# Patient Record
Sex: MALE | Race: BLACK OR AFRICAN AMERICAN | NOT HISPANIC OR LATINO | Employment: UNEMPLOYED | URBAN - METROPOLITAN AREA
[De-identification: names, ages, dates, MRNs, and addresses within clinical notes are randomized per-mention and may not be internally consistent; named-entity substitution may affect disease eponyms.]

---

## 2017-01-13 ENCOUNTER — HOSPITAL ENCOUNTER (OUTPATIENT)
Dept: MEDSURG UNIT | Facility: HOSPITAL | Age: 27
End: 2017-01-14
Attending: INTERNAL MEDICINE | Admitting: INTERNAL MEDICINE

## 2017-01-20 ENCOUNTER — HISTORICAL (OUTPATIENT)
Dept: INTERNAL MEDICINE | Facility: CLINIC | Age: 27
End: 2017-01-20

## 2017-01-26 ENCOUNTER — HISTORICAL (OUTPATIENT)
Dept: INTERNAL MEDICINE | Facility: CLINIC | Age: 27
End: 2017-01-26

## 2017-03-13 ENCOUNTER — HISTORICAL (OUTPATIENT)
Dept: ENDOSCOPY | Facility: HOSPITAL | Age: 27
End: 2017-03-13

## 2017-05-30 ENCOUNTER — HISTORICAL (OUTPATIENT)
Dept: INFUSION THERAPY | Facility: HOSPITAL | Age: 27
End: 2017-05-30

## 2017-06-13 ENCOUNTER — HISTORICAL (OUTPATIENT)
Dept: INFUSION THERAPY | Facility: HOSPITAL | Age: 27
End: 2017-06-13

## 2017-08-04 ENCOUNTER — HISTORICAL (OUTPATIENT)
Dept: INFUSION THERAPY | Facility: HOSPITAL | Age: 27
End: 2017-08-04

## 2017-09-29 ENCOUNTER — HISTORICAL (OUTPATIENT)
Dept: INFUSION THERAPY | Facility: HOSPITAL | Age: 27
End: 2017-09-29

## 2017-09-29 LAB
ABS NEUT (OLG): 2.62 X10(3)/MCL (ref 2.1–9.2)
ALBUMIN SERPL-MCNC: 3.8 GM/DL (ref 3.4–5)
ALBUMIN/GLOB SERPL: 1 RATIO (ref 1–2)
ALP SERPL-CCNC: 104 UNIT/L (ref 45–117)
ALT SERPL-CCNC: 14 UNIT/L (ref 12–78)
AST SERPL-CCNC: 15 UNIT/L (ref 15–37)
BASOPHILS # BLD AUTO: 0.03 X10(3)/MCL
BASOPHILS NFR BLD AUTO: 1 % (ref 0–1)
BILIRUB SERPL-MCNC: 0.3 MG/DL (ref 0.2–1)
BILIRUBIN DIRECT+TOT PNL SERPL-MCNC: 0.1 MG/DL
BILIRUBIN DIRECT+TOT PNL SERPL-MCNC: 0.2 MG/DL
BUN SERPL-MCNC: 9 MG/DL (ref 7–18)
CALCIUM SERPL-MCNC: 9.1 MG/DL (ref 8.5–10.1)
CHLORIDE SERPL-SCNC: 104 MMOL/L (ref 98–107)
CO2 SERPL-SCNC: 29 MMOL/L (ref 21–32)
CREAT SERPL-MCNC: 1 MG/DL (ref 0.6–1.3)
EOSINOPHIL # BLD AUTO: 0.03 X10(3)/MCL
EOSINOPHIL NFR BLD AUTO: 1 % (ref 0–5)
ERYTHROCYTE [DISTWIDTH] IN BLOOD BY AUTOMATED COUNT: 12.9 % (ref 11.5–14.5)
GLOBULIN SER-MCNC: 4.1 GM/ML (ref 2.3–3.5)
GLUCOSE SERPL-MCNC: 103 MG/DL (ref 74–106)
HCT VFR BLD AUTO: 44.6 % (ref 40–51)
HGB BLD-MCNC: 14.9 GM/DL (ref 13.5–17.5)
IMM GRANULOCYTES # BLD AUTO: 0.01 10*3/UL
IMM GRANULOCYTES NFR BLD AUTO: 0 %
LYMPHOCYTES # BLD AUTO: 1.12 X10(3)/MCL
LYMPHOCYTES NFR BLD AUTO: 28 % (ref 15–40)
MCH RBC QN AUTO: 26.7 PG (ref 26–34)
MCHC RBC AUTO-ENTMCNC: 33.4 GM/DL (ref 31–37)
MCV RBC AUTO: 79.9 FL (ref 80–100)
MONOCYTES # BLD AUTO: 0.27 X10(3)/MCL
MONOCYTES NFR BLD AUTO: 7 % (ref 4–12)
NEUTROPHILS # BLD AUTO: 2.62 X10(3)/MCL
NEUTROPHILS NFR BLD AUTO: 64 X10(3)/MCL
PLATELET # BLD AUTO: 247 X10(3)/MCL (ref 130–400)
PMV BLD AUTO: 10.2 FL (ref 7.4–10.4)
POTASSIUM SERPL-SCNC: 3.9 MMOL/L (ref 3.5–5.1)
PROT SERPL-MCNC: 7.9 GM/DL (ref 6.4–8.2)
RBC # BLD AUTO: 5.58 X10(6)/MCL (ref 4.5–5.9)
SODIUM SERPL-SCNC: 137 MMOL/L (ref 136–145)
WBC # SPEC AUTO: 4.1 X10(3)/MCL (ref 4.5–11)

## 2021-11-15 ENCOUNTER — HISTORICAL (OUTPATIENT)
Dept: LAB | Facility: HOSPITAL | Age: 31
End: 2021-11-15

## 2021-11-15 LAB
ABS NEUT (OLG): 3.4 X10(3)/MCL (ref 2.1–9.2)
ALBUMIN SERPL-MCNC: 2.3 GM/DL (ref 3.5–5)
ALBUMIN/GLOB SERPL: 0.5 RATIO (ref 1.1–2)
ALP SERPL-CCNC: 105 UNIT/L (ref 40–150)
ALT SERPL-CCNC: 17 UNIT/L (ref 0–55)
AST SERPL-CCNC: 14 UNIT/L (ref 5–34)
BASOPHILS # BLD AUTO: 0 X10(3)/MCL (ref 0–0.2)
BASOPHILS NFR BLD AUTO: 1 %
BILIRUB SERPL-MCNC: 0.3 MG/DL
BILIRUBIN DIRECT+TOT PNL SERPL-MCNC: 0.1 MG/DL (ref 0–0.8)
BILIRUBIN DIRECT+TOT PNL SERPL-MCNC: 0.2 MG/DL (ref 0–0.5)
BUN SERPL-MCNC: 6.2 MG/DL (ref 8.9–20.6)
CALCIUM SERPL-MCNC: 9.9 MG/DL (ref 8.7–10.5)
CHLORIDE SERPL-SCNC: 100 MMOL/L (ref 98–107)
CO2 SERPL-SCNC: 25 MMOL/L (ref 22–29)
CREAT SERPL-MCNC: 0.88 MG/DL (ref 0.73–1.18)
CRP SERPL HS-MCNC: 13.46 MG/DL
DEPRECATED CALCIDIOL+CALCIFEROL SERPL-MC: 11.8 NG/ML (ref 30–80)
EOSINOPHIL # BLD AUTO: 0.2 X10(3)/MCL (ref 0–0.9)
EOSINOPHIL NFR BLD AUTO: 4 %
ERYTHROCYTE [DISTWIDTH] IN BLOOD BY AUTOMATED COUNT: 14.2 % (ref 11.5–14.5)
FERRITIN SERPL-MCNC: 461.6 NG/ML (ref 21.81–274.66)
FOLATE SERPL-MCNC: 5.7 NG/ML (ref 7–31.4)
GLOBULIN SER-MCNC: 5 GM/DL (ref 2.4–3.5)
GLUCOSE SERPL-MCNC: 105 MG/DL (ref 74–100)
HBV SURFACE AG SERPL QL IA: NONREACTIVE
HCT VFR BLD AUTO: 43.7 % (ref 40–51)
HGB BLD-MCNC: 13.6 GM/DL (ref 13.5–17.5)
IMM GRANULOCYTES # BLD AUTO: 0.03 10*3/UL
IMM GRANULOCYTES NFR BLD AUTO: 1 %
IRON SERPL-MCNC: 15 UG/DL (ref 65–175)
LYMPHOCYTES # BLD AUTO: 1.1 X10(3)/MCL (ref 0.6–4.6)
LYMPHOCYTES NFR BLD AUTO: 21 %
MCH RBC QN AUTO: 24.7 PG (ref 26–34)
MCHC RBC AUTO-ENTMCNC: 31.1 GM/DL (ref 31–37)
MCV RBC AUTO: 79.3 FL (ref 80–100)
MONOCYTES # BLD AUTO: 0.4 X10(3)/MCL (ref 0.1–1.3)
MONOCYTES NFR BLD AUTO: 8 %
NEG CONT SPOT COUNT: NORMAL
NEUTROPHILS # BLD AUTO: 3.4 X10(3)/MCL (ref 2.1–9.2)
NEUTROPHILS NFR BLD AUTO: 65 %
NRBC BLD AUTO-RTO: 0 % (ref 0–0.2)
PANEL A SPOT COUNT: 0
PANEL B SPOT COUNT: 0
PLATELET # BLD AUTO: 557 X10(3)/MCL (ref 130–400)
PMV BLD AUTO: 9 FL (ref 7.4–10.4)
POS CONT SPOT COUNT: NORMAL
POTASSIUM SERPL-SCNC: 4.3 MMOL/L (ref 3.5–5.1)
PROT SERPL-MCNC: 7.3 GM/DL (ref 6.4–8.3)
RBC # BLD AUTO: 5.51 X10(6)/MCL (ref 4.5–5.9)
SODIUM SERPL-SCNC: 135 MMOL/L (ref 136–145)
T-SPOT.TB: NORMAL
VIT B12 SERPL-MCNC: 485 PG/ML (ref 213–816)
WBC # SPEC AUTO: 5.2 X10(3)/MCL (ref 4.5–11)

## 2021-11-24 ENCOUNTER — HISTORICAL (OUTPATIENT)
Dept: ENDOSCOPY | Facility: HOSPITAL | Age: 31
End: 2021-11-24

## 2021-11-24 LAB — SARS-COV-2 AG RESP QL IA.RAPID: NEGATIVE

## 2021-12-16 ENCOUNTER — HISTORICAL (OUTPATIENT)
Dept: INFUSION THERAPY | Facility: HOSPITAL | Age: 31
End: 2021-12-16

## 2022-04-11 ENCOUNTER — HISTORICAL (OUTPATIENT)
Dept: ADMINISTRATIVE | Facility: HOSPITAL | Age: 32
End: 2022-04-11
Payer: MEDICAID

## 2022-04-27 VITALS
SYSTOLIC BLOOD PRESSURE: 98 MMHG | HEIGHT: 71 IN | DIASTOLIC BLOOD PRESSURE: 67 MMHG | BODY MASS INDEX: 17.96 KG/M2 | WEIGHT: 128.31 LBS

## 2022-05-04 NOTE — HISTORICAL [facility]ER
This is a historical note converted from [facility]. Formatting and pictures may have been removed.  Please reference C[facility]an for original formatting and attached multimedia. Chief Complaint  Here for colonoscopy  History of Present Illness  30y/o?AAM, pMHx?with Crohns colitis, presents today for colonoscopy. He is not currently on any therapy.  ?   He was diagnosed in March 2017 after experiencing several months of diarrhea, 100 lb weight loss, rectal bleeding, anemia, and abdominal pain.  ?   C diff PCR positive Jan-March 2017 -- treated per notes  ?   He was started on Remicade in May 2017.? He did not receive standard loading doses.? He received 4 q8 weeks doses until September 2017 and then did not receive any further infusions (he states they were stopped, documented that he no showed).? Regardless he felt better with infusions and went until July/August 2021 without any significant symptoms except occasional diarrhea with dietary indiscretion.? His weight improved following infusions and no abd pain.?  ?   He was incarcerated in 2018 and then for 90 days in 2021, released around September 2021.  ?   In?August 2021 he began with diarrhea, intermittent?lower abdominal pain, night sweats, and weight loss.? He has on average 6+ watery to loose stools a day without any bleeding.? He has at least 1 loose BM?at night. ?He has lost approximately 60 pounds since symptoms started.? He is eating less?due to his abdominal symptoms as some foods cause worsening symptoms.? He denies any dysphagia, heartburn, reflux.? He was seen by?GI in [place]?during his incarceration?but no work-up was performed prior to his release.  ?   He quit smoking in early 2021.? He is not drinking any alcohol mainly due to his GI symptoms. ?He denies any present or past history of marijuana or recreational drug use.? He denies any family history of colon polyps, colon cancer, ulcerative colitis, Crohns disease or other GI illnesses.  ?   Prior  Endoscopy:  ?  March 13, 2017 Colonoscopy (Dr. Whitaker): normal SHAYY, normal rectum.? Erosions, erythema in the TI, IC valve.? Erosions, ulcers and edema in cobblestone appearance from sigmoid to ascending colon.  March 13, 2017 EGD (Dr. Whitaker): patchy areas of erythema and raised swelling in the antrum  Review of Systems  General:?well-developed well-nourished in no acute distress  Eye: PERRLA, EOMI, clear conjunctiva  HENT:? oropharynx without erythema/exudate, oropharynx and nasal mucosal surfaces moist  Neck:? no thyromegaly or lymphadenopathy, trachea midline  Respiratory:?symmetrical chest expansion and respiratory effort, clear to auscultation bilaterally  Cardiovascular:?regular rate and rhythm without murmurs, gallops or rubs  Gastrointestinal:?soft, non-tender, non-distended, normoactive bowel sounds?without masses to palpation  Integumentary: no rashes or skin lesions present  Neurologic: cranial nerves intact, no asterixis, awake, alert, and oriented  Psych: good insight, appropriate mood, normal affect  ?  Physical Exam  Vitals & Measurements  T:?36.7? ?C (Oral)? HR:?118(Monitored)? RR:?18? BP:?109/73? SpO2:?98%? WT:?56.9?kg? BMI:?17.56?  General:?well-developed well-nourished in no acute distress  Eye: PERRLA, EOMI, clear conjunctiva  HENT:? oropharynx without erythema/exudate, oropharynx and nasal mucosal surfaces moist  Neck:? no thyromegaly or lymphadenopathy, trachea midline  Respiratory:?symmetrical chest expansion and respiratory effort, clear to auscultation bilaterally  Cardiovascular:?regular rate and rhythm without murmurs, gallops or rubs  Gastrointestinal:?soft, non-tender, non-distended, normoactive bowel sounds?without masses to palpation  Integumentary: no rashes or skin lesions present  Neurologic: cranial nerves intact, no asterixis, awake, alert, and oriented  Psych: good insight, appropriate mood, normal affect  Assessment/Plan  Mr. Corrales is a 30y/o?AAM, pMHx?with Crohns colitis,  presents today for colonoscopy.  ?   Risks, benefits, and alternatives of the procedure discussed.?  Will proceed with endoscopic procedure as scheduled.   Problem List/Past Medical History  Ongoing  Anemia  Crohns colitis  Crohns disease  Heart murmur  Tobacco user  Historical  No qualifying data  Procedure/Surgical History  Repair Left Foot Skin, External Approach (05/29/2021)  Simple repair of superficial wounds of scalp, neck, axillae, external genitalia, trunk and/or extremities (including hands and feet); 2.6 cm to 7.5 cm (05/29/2021)  Biopsy Gastrointestinal (03/13/2017)  Colonoscopy (03/13/2017)  Colonoscopy, flexible; with biopsy, single or multiple (03/13/2017)  Esophagogastroduodenoscopy (03/13/2017)  Esophagogastroduodenoscopy, flexible, transoral; with biopsy, single or multiple (03/13/2017)  Excision of Ascending Colon, Via Natural or Artificial Opening Endoscopic, Diagnostic (03/13/2017)  Excision of Stomach, Via Natural or Artificial Opening Endoscopic, Diagnostic (03/13/2017)  RED BLOOD CELLS, EACH UNIT (01/13/2017)  Transfusion of Nonautologous Red Blood Cells into Peripheral Vein, Percutaneous Approach (01/13/2017)  Transfusion, blood or blood components (01/13/2017)   Medications  Inpatient  IVF Lactated Ringers LR Infusion 1,000 mL, 1000 mL, IV  Home  ergocalciferol 50,000 intl units (1.25 mg) oral capsule, 87361 IntUnit, Oral, qWeek  folic acid 1 mg oral tablet, 1 mg= 1 tab(s), Oral, Daily, 11 refills  Allergies  No Known Allergies  Social History  Abuse/Neglect  No, 11/24/2021  Alcohol - Low Risk, 07/23/2014  Current, 1-2 times per year, 11/23/2021  Employment/School  Unemployed, Highest education level: High school., 01/12/2017  Exercise  Self assessment: Fair condition., 01/12/2017  Home/Environment  Lives with Significant other. Living situation: Home/Independent. Alcohol abuse in household: No. Substance abuse in household: No. Smoker in household: Yes. Feels unsafe at home: No. Safe place  to go: Yes. Family/Friends available for support: Yes., 01/12/2017  Nutrition/Health  Regular, Caffeine intake amount: soda 6 12oz daily. Wants to lose weight: No. Sleeping concerns: No. Feels highly stressed: Yes., 01/12/2017  Substance Use  Never, 09/01/2016  Tobacco - Medium Risk, 07/23/2014  Former smoker, quit more than 30 days ago, N/A, 11/24/2021  Family History  Hypertension.: Father.  Seizure: Sister.  Immunizations  Vaccine Date Status   tuberculin purified protein derivative 03/13/2017 Given       Seen in clinic yesterday.? History as above.

## 2022-05-10 ENCOUNTER — OFFICE VISIT (OUTPATIENT)
Dept: GASTROENTEROLOGY | Facility: CLINIC | Age: 32
End: 2022-05-10
Payer: MEDICAID

## 2022-05-10 VITALS
HEIGHT: 70 IN | SYSTOLIC BLOOD PRESSURE: 114 MMHG | RESPIRATION RATE: 18 BRPM | DIASTOLIC BLOOD PRESSURE: 76 MMHG | WEIGHT: 156 LBS | BODY MASS INDEX: 22.33 KG/M2 | TEMPERATURE: 99 F | HEART RATE: 54 BPM

## 2022-05-10 DIAGNOSIS — K50.80 CROHN'S DISEASE OF BOTH SMALL AND LARGE INTESTINE WITHOUT COMPLICATION: Primary | ICD-10-CM

## 2022-05-10 PROCEDURE — 90670 PCV13 VACCINE IM: CPT | Mod: PBBFAC

## 2022-05-10 PROCEDURE — 90471 IMMUNIZATION ADMIN: CPT | Mod: PBBFAC

## 2022-05-10 PROCEDURE — 99214 OFFICE O/P EST MOD 30 MIN: CPT | Mod: PBBFAC | Performed by: INTERNAL MEDICINE

## 2022-05-10 RX ORDER — POLYETHYLENE GLYCOL 3350, SODIUM SULFATE, SODIUM CHLORIDE, POTASSIUM CHLORIDE, SODIUM ASCORBATE, AND ASCORBIC ACID 7.5-2.691G
2000 KIT ORAL ONCE
Qty: 1 KIT | Refills: 0 | Status: CANCELLED | OUTPATIENT
Start: 2022-05-10 | End: 2022-05-10

## 2022-05-10 NOTE — ASSESSMENT & PLAN NOTE
Diagnosed March 2017.  Remicade infusions x 4 in 2017  Off therapy x years  November 2021: Fecal calprotectin 8128, HS CRP 13.46   November 2021: Colonoscopy: active disease from rectosigmoid to cecum and in the TI   Stelara induction December 2021.  Reports compliance with 90 mg every 8 weeks  February 10, 2022: Ustek level: 3.9 mcg/mL  Repeat CRP, fecal calprotectin  Labs today  Schedule colonoscopy to assess for deep remission

## 2022-05-10 NOTE — PROGRESS NOTES
HX of coronary artery disease?  no    Heart Attack? no    Stent placement? no  If yes last stent placed na    Bypass surgery?  no  If yes date of sx na    Pacemaker? no  Defibrillator? no  (Send CIED form to cardio)    HX of AFIB- ABN heart beat? no  HX of SVT (supraventricular Tachycardia)? no    HX of CHF? no    Chest Pain?  If yes, Frequency and has it been evaluated by cardio?  no    Can you walk up a flight of stairs without getting SOB or have chest pain?  yes    Do you see cardio? no    MD Name?  na    Do you take a blood thinner? no

## 2022-05-10 NOTE — PROGRESS NOTES
Inflammatory Bowel Disease        Established Patient Note         TODAY'S VISIT DATE:  5/10/2022  The patient's last visit with me was on Visit date not found.     PCP: Primary Doctor No      Referring MD:   No ref. provider found    History of Present Illness:  Mr. Corrales is a 31 year old AAM with Crohn's ileocolitis on Stelara 90mg every 8 weeks here for follow-up.    He was diagnosed in March 2017 after experiencing several months of diarrhea, 100 lb weight loss, rectal bleeding, anemia, and abdominal pain.    March 13, 2017 Colonoscopy (Dr. Whitaker): normal SHAYY, normal rectum. Erosions, erythema in the TI, IC valve. Erosions, ulcers and edema in cobblestone appearance from sigmoid to ascending colon.  March 13, 2017 EGD (Dr. Whitaker): patchy areas of erythema and raised swelling in the antrum    C diff PCR positive Jan-March 2017 -- treated per notes    He was started on Remicade in May 2017. He did not receive standard loading doses. He received 4 q8 weeks doses until September 2017 and then did not receive any further infusions (he states they were stopped, documented that he no showed). Regardless he felt better with infusions and went until July/August 2021 without any significant symptoms except occasional diarrhea with dietary indiscretion. His weight improved following infusions and no abd pain.     He was incarcerated in 2018 and then for 90 days in 2021, released around September 2021.    In August 2021 he began with diarrhea, intermittent lower abdominal pain, night sweats, and weight loss. He had lost approximately 60 pounds since symptoms started. He denied any dysphagia, heartburn, reflux.     November 2021 Colonoscopy: inflammation including deep ulcerations in the TI and from the sigmoid to cecum with rectal sparing.    Fecal calprotectin: 8128    He was started on Stelara in December 2021.  He reports compliance with Stelara 90 mg every 8 weeks.    Today he reports  doing better.  He has gained 35 lbs.  He is having 2 BMs a day still loose to soft without any blood.  Occasional abdominal cramping.  Approximately 1 week before injection is due he notices more abdominal cramping but no change in stool frequency.      He is smoking 4-5 cigarettes a day.  He is not drinking any alcohol mainly due to his GI symptoms. He denies any present or past history of marijuana or recreational drug use. He denies any family history of colon polyps, colon cancer, ulcerative colitis, Crohn's disease or other GI illnesses.        IBD History:   IBD disease: Crohn's disease   Disease location: Ileum, colon   Disease behavior: nonpenetrating, non stricturing      Current IBD Therapy:  Stelara 90mg every 8 weeks (December 2021 - present)    Therapeutic Drug Monitoring Labs:  February 10, 2022: Ustek level: 3.9 mcg/mL    Prior IBD Therapies:  Remicade infusions x 4 (May 2017-Sept 2017)  Prednisone      Pertinent Endoscopy/Imaging:  March 13, 2017 Colonoscopy (Dr. Whitaker): normal SHAYY, normal rectum. Erosions, erythema in the TI, IC valve. Erosions, ulcers and edema in cobblestone appearance from sigmoid to ascending colon.    March 13, 2017 EGD (Dr. Whitaker): patchy areas of erythema and raised swelling in the antrum    November 24, 2021: scattered shallow and deep serpentine ulcerations in the TI.  From the rectosigmoid (15 cm from the anal verge) to the cecum there was inflammation with confluent, deep and serpentine ulcerations.  Pathology: colon: moderate active chronic colitis, rectum: mild chronic inactive colitis    Prior Pertinent Surgeries:   None    Complications:   None    Extraintestinal Manifestations:  None        Medical/Surgical History:   Past Medical History:   Diagnosis Date    Abdominal pain     Anemia, unspecified     Crohn's colitis     Crohn's disease     Heart murmur     Irritable bowel syndrome (IBS)      Past Surgical History:   Procedure Laterality Date    COLONOSCOPY       "COLONOSCOPY W/ BIOPSIES      ESOPHAGOGASTRODUODENOSCOPY      ESOPHAGOGASTRODUODENOSCOPY N/A     Excision of ascending colon      Excision of stomach      transfusion of blood or blood components           Family History:   Family History   Problem Relation Age of Onset    Hypertension Father     Seizures Sister     Cancer Maternal Grandmother     Cancer Maternal Aunt         Social History:   Social History     Socioeconomic History    Marital status: Single   Tobacco Use    Smoking status: Current Every Day Smoker     Packs/day: 0.25     Years: 13.00     Pack years: 3.25     Types: Cigarettes    Smokeless tobacco: Current User   Substance and Sexual Activity    Alcohol use: Yes     Alcohol/week: 4.0 standard drinks     Types: 4 Cans of beer per week    Drug use: No        Review of patient's allergies indicates:  No Known Allergies    Current Medications:   Outpatient Medications Marked as Taking for the 5/10/22 encounter (Office Visit) with Brooke Correa MD   Medication Sig Dispense Refill    ustekinumab (STELARA) 90 mg/mL Syrg syringe Inject 1 mL into the skin every 8 weeks.          Vital Signs:  /76 (BP Location: Left arm, Patient Position: Sitting, BP Method: Medium (Automatic))   Pulse (!) 54   Temp 98.6 °F (37 °C)   Resp 18   Ht 5' 10" (1.778 m)   Wt 70.8 kg (156 lb)   BMI 22.38 kg/m²      Physical Exam  Vitals reviewed.   Constitutional:       Appearance: Normal appearance.   HENT:      Head: Normocephalic and atraumatic.      Mouth/Throat:      Mouth: Mucous membranes are moist.   Eyes:      Extraocular Movements: Extraocular movements intact.      Conjunctiva/sclera: Conjunctivae normal.   Cardiovascular:      Rate and Rhythm: Normal rate and regular rhythm.   Pulmonary:      Effort: Pulmonary effort is normal.      Breath sounds: Normal breath sounds.   Abdominal:      General: Bowel sounds are normal.      Palpations: Abdomen is soft.      Tenderness: There is no abdominal tenderness. "   Musculoskeletal:      Cervical back: Normal range of motion.   Skin:     General: Skin is warm and dry.   Neurological:      General: No focal deficit present.      Mental Status: He is alert and oriented to person, place, and time.   Psychiatric:         Mood and Affect: Mood normal.         Behavior: Behavior normal.       Labs: Reviewed November 2021  Hgb   Date Value Ref Range Status   11/15/2021 13.6 13.5 - 17.5 gm/dL Final     Albumin Level   Date Value Ref Range Status   11/15/2021 2.3 (L) 3.5 - 5.0 gm/dL Final     Iron Level   Date Value Ref Range Status   11/15/2021 15 (L) 65 - 175 ug/dL Final     Ferritin Level   Date Value Ref Range Status   11/15/2021 461.60 (H) 21.81 - 274.66 ng/mL Final     Folate Level   Date Value Ref Range Status   11/15/2021 5.7 (L) 7.0 - 31.4 ng/mL Final     Vit D 25 OH   Date Value Ref Range Status   11/15/2021 11.8 (L) 30.0 - 80.0 ng/mL Final     C-Reactive Protein   Date Value Ref Range Status   01/01/2020 0.7 (H) <<=0.3 mg/dL Final        B12: 485 pg/mL  Zinc: 753 mcg/dL  T spot: negative   HBsAg: negative  Fecal calprotectin: 8128       Assessment/Plan:    Problem List Items Addressed This Visit          GI    Crohn's disease of both small and large intestine without complication - Primary     Diagnosed March 2017.  Remicade infusions x 4 in 2017  Off therapy x years  November 2021: Fecal calprotectin 8128, HS CRP 13.46   November 2021: Colonoscopy: active disease from rectosigmoid to cecum and in the TI   Stelara induction December 2021.  Reports compliance with 90 mg every 8 weeks  February 10, 2022: Ustek level: 3.9 mcg/mL  Repeat CRP, fecal calprotectin  Labs today  Schedule colonoscopy to assess for deep remission           Relevant Orders    Case Request Endoscopy: COLONOSCOPY (Completed)    Calprotectin, Stool    (In Office Administered) Pneumococcal Conjugate Vaccine (13 Valent) (IM) (Completed)         HEALTH MAINTENANCE:     Vaccinations:    Influenza (inactive):   recommend annually   PCV 13 (Prevnar): recommend  PPSV 23 (Pneumovax): 2-12 mos after PCV 13, repeat 5 years later and then after age 66 yo  Tetanus (TdaP): recommend every 10 years  HPV (males and females ages 11-44 yo): N/A  Meningococcal: No risk factors   Hepatitis B: Check HBsAb   Hepatitis A:  Check HAV IgG   MMR (live vaccine): UTD   Chickenpox status/Varicella (live vaccine):   will check immunity     Shingrix: recommended   COVID-19:  received x 2 per patient    Colorectal cancer risk:  Risk factors: > 8 years of disease, > 1/3 colon involved  - Distribution of colonic disease: > 1/3 of colon  - Year of symptom onset: 2017  - Colonoscopy for surveillance after endoscopic remission    Ophthalmologic exam recommended yearly  Dermatologic exam recommended yearly due to risk of skin cancer with IBD/meds    Bone health:  Calcium 9571-6801 mg daily and vitamin D 800 IU daily  Risk factors for osteopenia/osteoporosis:  Vitamin D: 11.8  Ergocalciferol 50,000 IU weekly x 12 weeks     DEXA scan: Recommend baseline    Smoking status: current smoker, discussed cessation      Follow up: following colonoscopy

## 2022-07-07 ENCOUNTER — ANESTHESIA EVENT (OUTPATIENT)
Dept: ENDOSCOPY | Facility: HOSPITAL | Age: 32
End: 2022-07-07
Payer: MEDICAID

## 2022-07-07 DIAGNOSIS — K50.80 CROHN'S DISEASE OF BOTH SMALL AND LARGE INTESTINE WITHOUT COMPLICATION: Primary | ICD-10-CM

## 2022-07-07 RX ORDER — LIDOCAINE HYDROCHLORIDE 10 MG/ML
1 INJECTION, SOLUTION EPIDURAL; INFILTRATION; INTRACAUDAL; PERINEURAL ONCE
Status: CANCELLED | OUTPATIENT
Start: 2022-07-07 | End: 2022-07-07

## 2022-07-07 NOTE — ANESTHESIA PREPROCEDURE EVALUATION
07/07/2022  Tee Corrales is a 31 y.o., male with Chron's, cardiac murmur at birth resolved.      Pre-op Assessment    I have reviewed the NPO Status.      Review of Systems  Anesthesia Hx:  Denies Family Hx of Anesthesia complications.   Denies Personal Hx of Anesthesia complications.       Physical Exam  General: Well nourished    Airway:  Mallampati: I   Mouth Opening: Normal  TM Distance: Normal  Tongue: Normal  Neck ROM: Normal ROM    Dental:  Intact    Chest/Lungs:  Clear to auscultation, Normal Respiratory Rate    Heart:  Rate: Normal  Rhythm: Regular Rhythm        Anesthesia Plan  Type of Anesthesia, risks & benefits discussed:    Anesthesia Type: Gen Natural Airway  Intra-op Monitoring Plan: Standard ASA Monitors  Induction:  IV  Informed Consent: Informed consent signed with the Patient and all parties understand the risks and agree with anesthesia plan.  All questions answered.   ASA Score: 3  Day of Surgery Review of History & Physical: H&P Update referred to the surgeon/provider.I have interviewed and examined the patient. I have reviewed the patient's H&P dated: There are no significant changes. H&P completed by Anesthesiologist.    Ready For Surgery From Anesthesia Perspective.     .

## 2022-07-11 RX ORDER — POLYETHYLENE GLYCOL 3350, SODIUM SULFATE, SODIUM CHLORIDE, POTASSIUM CHLORIDE, SODIUM ASCORBATE, AND ASCORBIC ACID 7.5-2.691G
2000 KIT ORAL SEE ADMIN INSTRUCTIONS
Qty: 1 KIT | Refills: 0 | Status: ON HOLD | OUTPATIENT
Start: 2022-07-11 | End: 2022-07-15 | Stop reason: HOSPADM

## 2022-07-15 ENCOUNTER — HOSPITAL ENCOUNTER (OUTPATIENT)
Facility: HOSPITAL | Age: 32
Discharge: HOME | End: 2022-07-15
Attending: INTERNAL MEDICINE | Admitting: INTERNAL MEDICINE
Payer: MEDICAID

## 2022-07-15 ENCOUNTER — ANESTHESIA (OUTPATIENT)
Dept: ENDOSCOPY | Facility: HOSPITAL | Age: 32
End: 2022-07-15
Payer: MEDICAID

## 2022-07-15 DIAGNOSIS — K50.80 CROHN'S DISEASE OF BOTH SMALL AND LARGE INTESTINE WITHOUT COMPLICATION: ICD-10-CM

## 2022-07-15 LAB
ALBUMIN SERPL-MCNC: 3.8 GM/DL (ref 3.5–5)
ALBUMIN/GLOB SERPL: 1.2 RATIO (ref 1.1–2)
ALP SERPL-CCNC: 92 UNIT/L (ref 40–150)
ALT SERPL-CCNC: 23 UNIT/L (ref 0–55)
AST SERPL-CCNC: 26 UNIT/L (ref 5–34)
BASOPHILS # BLD AUTO: 0.05 X10(3)/MCL (ref 0–0.2)
BASOPHILS NFR BLD AUTO: 1 %
BILIRUBIN DIRECT+TOT PNL SERPL-MCNC: 0.5 MG/DL
BUN SERPL-MCNC: 8.5 MG/DL (ref 8.9–20.6)
CALCIUM SERPL-MCNC: 9.3 MG/DL (ref 8.4–10.2)
CHLORIDE SERPL-SCNC: 108 MMOL/L (ref 98–107)
CO2 SERPL-SCNC: 22 MMOL/L (ref 22–29)
CREAT SERPL-MCNC: 0.98 MG/DL (ref 0.73–1.18)
CRP SERPL-MCNC: 3.8 MG/L
DEPRECATED CALCIDIOL+CALCIFEROL SERPL-MC: 27 NG/ML (ref 30–80)
EOSINOPHIL # BLD AUTO: 0.2 X10(3)/MCL (ref 0–0.9)
EOSINOPHIL NFR BLD AUTO: 3.9 %
ERYTHROCYTE [DISTWIDTH] IN BLOOD BY AUTOMATED COUNT: 14.8 % (ref 11.5–17)
FERRITIN SERPL-MCNC: 67.47 NG/ML (ref 21.81–274.66)
FOLATE SERPL-MCNC: 9.9 NG/ML (ref 7–31.4)
GLOBULIN SER-MCNC: 3.2 GM/DL (ref 2.4–3.5)
GLUCOSE SERPL-MCNC: 149 MG/DL (ref 74–100)
HAV AB SER QL IA: NONREACTIVE
HBV SURFACE AB SER-ACNC: 9.93 MIU/ML
HBV SURFACE AB SERPL IA-ACNC: ABNORMAL M[IU]/ML
HCT VFR BLD AUTO: 42.6 % (ref 42–52)
HGB BLD-MCNC: 14.2 GM/DL (ref 14–18)
IMM GRANULOCYTES # BLD AUTO: 0.01 X10(3)/MCL (ref 0–0.04)
IMM GRANULOCYTES NFR BLD AUTO: 0.2 %
IRON SATN MFR SERPL: 28 % (ref 20–50)
IRON SERPL-MCNC: 90 UG/DL (ref 65–175)
LYMPHOCYTES # BLD AUTO: 1.15 X10(3)/MCL (ref 0.6–4.6)
LYMPHOCYTES NFR BLD AUTO: 22.5 %
MCH RBC QN AUTO: 29.2 PG (ref 27–31)
MCHC RBC AUTO-ENTMCNC: 33.3 MG/DL (ref 33–36)
MCV RBC AUTO: 87.5 FL (ref 80–94)
MONOCYTES # BLD AUTO: 0.28 X10(3)/MCL (ref 0.1–1.3)
MONOCYTES NFR BLD AUTO: 5.5 %
NEUTROPHILS # BLD AUTO: 3.4 X10(3)/MCL (ref 2.1–9.2)
NEUTROPHILS NFR BLD AUTO: 66.9 %
NRBC BLD AUTO-RTO: 0 %
PLATELET # BLD AUTO: 210 X10(3)/MCL (ref 130–400)
PMV BLD AUTO: 10.7 FL (ref 7.4–10.4)
POTASSIUM SERPL-SCNC: 4.1 MMOL/L (ref 3.5–5.1)
PROT SERPL-MCNC: 7 GM/DL (ref 6.4–8.3)
RBC # BLD AUTO: 4.87 X10(6)/MCL (ref 4.7–6.1)
SODIUM SERPL-SCNC: 138 MMOL/L (ref 136–145)
TIBC SERPL-MCNC: 232 UG/DL (ref 69–240)
TIBC SERPL-MCNC: 322 UG/DL (ref 250–450)
TRANSFERRIN SERPL-MCNC: 274 MG/DL (ref 174–364)
WBC # SPEC AUTO: 5.1 X10(3)/MCL (ref 4.5–11.5)

## 2022-07-15 PROCEDURE — 36415 COLL VENOUS BLD VENIPUNCTURE: CPT

## 2022-07-15 PROCEDURE — 45380 COLONOSCOPY AND BIOPSY: CPT | Performed by: INTERNAL MEDICINE

## 2022-07-15 PROCEDURE — 25000003 PHARM REV CODE 250: Performed by: NURSE ANESTHETIST, CERTIFIED REGISTERED

## 2022-07-15 PROCEDURE — 37000009 HC ANESTHESIA EA ADD 15 MINS: Performed by: INTERNAL MEDICINE

## 2022-07-15 PROCEDURE — 27201423 OPTIME MED/SURG SUP & DEVICES STERILE SUPPLY: Performed by: INTERNAL MEDICINE

## 2022-07-15 PROCEDURE — 00811 ANES LWR INTST NDSC NOS: CPT | Performed by: INTERNAL MEDICINE

## 2022-07-15 PROCEDURE — 86706 HEP B SURFACE ANTIBODY: CPT

## 2022-07-15 PROCEDURE — 86708 HEPATITIS A ANTIBODY: CPT

## 2022-07-15 PROCEDURE — 63600175 PHARM REV CODE 636 W HCPCS: Performed by: NURSE ANESTHETIST, CERTIFIED REGISTERED

## 2022-07-15 PROCEDURE — 63600175 PHARM REV CODE 636 W HCPCS: Performed by: ANESTHESIOLOGY

## 2022-07-15 PROCEDURE — 37000008 HC ANESTHESIA 1ST 15 MINUTES: Performed by: INTERNAL MEDICINE

## 2022-07-15 RX ORDER — PROPOFOL 10 MG/ML
VIAL (ML) INTRAVENOUS
Status: DISCONTINUED | OUTPATIENT
Start: 2022-07-15 | End: 2022-07-15

## 2022-07-15 RX ORDER — LIDOCAINE HYDROCHLORIDE 20 MG/ML
INJECTION, SOLUTION EPIDURAL; INFILTRATION; INTRACAUDAL; PERINEURAL
Status: DISCONTINUED | OUTPATIENT
Start: 2022-07-15 | End: 2022-07-15

## 2022-07-15 RX ORDER — SODIUM CHLORIDE, SODIUM LACTATE, POTASSIUM CHLORIDE, CALCIUM CHLORIDE 600; 310; 30; 20 MG/100ML; MG/100ML; MG/100ML; MG/100ML
INJECTION, SOLUTION INTRAVENOUS CONTINUOUS
Status: DISCONTINUED | OUTPATIENT
Start: 2022-07-15 | End: 2022-07-15

## 2022-07-15 RX ADMIN — PROPOFOL 30 MG: 10 INJECTION, EMULSION INTRAVENOUS at 09:07

## 2022-07-15 RX ADMIN — PROPOFOL 50 MG: 10 INJECTION, EMULSION INTRAVENOUS at 10:07

## 2022-07-15 RX ADMIN — PROPOFOL 40 MG: 10 INJECTION, EMULSION INTRAVENOUS at 10:07

## 2022-07-15 RX ADMIN — PROPOFOL 20 MG: 10 INJECTION, EMULSION INTRAVENOUS at 09:07

## 2022-07-15 RX ADMIN — LIDOCAINE HYDROCHLORIDE 50 MG: 20 INJECTION, SOLUTION EPIDURAL; INFILTRATION; INTRACAUDAL; PERINEURAL at 09:07

## 2022-07-15 RX ADMIN — PROPOFOL 150 MG: 10 INJECTION, EMULSION INTRAVENOUS at 09:07

## 2022-07-15 RX ADMIN — PROPOFOL 20 MG: 10 INJECTION, EMULSION INTRAVENOUS at 10:07

## 2022-07-15 RX ADMIN — SODIUM CHLORIDE, POTASSIUM CHLORIDE, SODIUM LACTATE AND CALCIUM CHLORIDE: 600; 310; 30; 20 INJECTION, SOLUTION INTRAVENOUS at 08:07

## 2022-07-15 NOTE — [employer] PATIENT INSTRUCTIONS
Discharge Summary/Instructions after an Endoscopic Procedure  Patient Name: Tee Corrales  Patient MRN: [id number]  Patient YOB: 1990  Friday, July 15, 2022  Brooke Correa MD  Dear patient,  As a result of recent federal legislation (The Federal Cures Act), you may   receive lab or pathology results from your procedure in your [facility]   account before your physician is able to contact you. Your physician or   their representative will relay the results to you with their   recommendations at their soonest availability.  Thank you,  RESTRICTIONS:  During your procedure today, you received medications for sedation.  These   medications may affect your judgment, balance and coordination.  Therefore,   for 24 hours, you have the following restrictions:   - DO NOT drive a car, operate machinery, make legal/financial decisions,   sign important papers or drink alcohol.    ACTIVITY:  Today: no heavy lifting, straining or running due to procedural   sedation/anesthesia.  The following day: return to full activity including work.  DIET:  Eat and drink normally unless instructed otherwise.     TREATMENT FOR COMMON SIDE EFFECTS:  - Mild abdominal pain, nausea, belching, bloating or excessive gas:  rest,   eat lightly and use a heating pad.  - Sore Throat: treat with throat lozenges and/or gargle with warm salt   water.  - Because air was used during the procedure, expelling large amounts of air   from your rectum or belching is normal.  - If a bowel prep was taken, you may not have a bowel movement for 1-3 days.    This is normal.  SYMPTOMS TO WATCH FOR AND REPORT TO YOUR PHYSICIAN:  1. Abdominal pain or bloating, other than gas cramps.  2. Chest pain.  3. Back pain.  4. Signs of infection such as: chills or fever occurring within 24 hours   after the procedure.  5. Rectal bleeding, which would show as bright red, maroon, or black stools.   (A tablespoon of blood from the rectum is not serious, especially  if   hemorrhoids are present.)  6. Vomiting.  7. Weakness or dizziness.  GO DIRECTLY TO THE NEAREST EMERGENCY ROOM IF YOU HAVE ANY OF THE FOLLOWING:      Difficulty breathing              Chills and/or fever over 101 F   Persistent vomiting and/or vomiting blood   Severe abdominal pain   Severe chest pain   Black, tarry stools   Bleeding- more than one tablespoon   Any other symptom or condition that you feel may need urgent attention  Your doctor recommends these additional instructions:  If any biopsies were taken, your doctors clinic will contact you in 1 to 2   weeks with any results.  - Patient has a contact number available for emergencies.  The signs and   symptoms of potential delayed complications were discussed with the   patient.  Return to normal activities tomorrow.  Written discharge   instructions were provided to the patient.   - Discharge patient to home.   - Continue present medications, Stelara 90 mg every 8 weeks.  - Await pathology results.   - Return to GI clinic in 6 months with fecal calprotectin.   - Resume previous diet.   - Repeat colonoscopy at appropriate interval for surveillance.  For questions, problems or results please call your physician - Brooke Correa MD at Work:  [phone].  [facility] , EMERGENCY ROOM PHONE NUMBER: [phone]  IF A COMPLICATION OR EMERGENCY SITUATION ARISES AND YOU ARE UNABLE TO REACH   YOUR PHYSICIAN - GO DIRECTLY TO THE EMERGENCY ROOM.  MD Brooke Carmona MD  7/15/2022 10:30:32 AM  This report has been verified and signed electronically.  Dear patient,  As a result of recent federal legislation (The Federal Cures Act), you may   receive lab or pathology results from your procedure in your [facility]   account before your physician is able to contact you. Your physician or   their representative will relay the results to you with their   recommendations at their soonest availability.  Thank  you,  PROVATION

## 2022-07-15 NOTE — H&P
History and Physical    SUBJECTIVE:     History of Present Illness:  Mr. Corrales is a 31 y.o. male with crohns on stellara since April 2022 presenting for colonoscopy to monitor disease progression and evaluate for remission. Pt states his symptoms have improved since starting the medication and he has been eating regularly again. He is having 2-3 normal bowel movements daily. He intermittently noted blood in his stool back in April but has not noticed any recently. Pt denies pain with defecation, constipation/diarrhea as well as CP, SOB, F/N/V, abdominal pain. Completed bowel prep. NPO since midnight.    Allergies:  Review of patient's allergies indicates:  No Known Allergies    Home Medications:  No current facility-administered medications on file prior to encounter.     Current Outpatient Medications on File Prior to Encounter   Medication Sig    ustekinumab (STELARA) 90 mg/mL Syrg syringe Inject 1 mL into the skin every 8 weeks.       Past Medical History:   Diagnosis Date    Abdominal pain     Anemia, unspecified     Crohn's colitis     Crohn's disease     Heart murmur     Irritable bowel syndrome (IBS)      Past Surgical History:   Procedure Laterality Date    COLONOSCOPY      COLONOSCOPY W/ BIOPSIES      ESOPHAGOGASTRODUODENOSCOPY      ESOPHAGOGASTRODUODENOSCOPY N/A     Excision of ascending colon      Excision of stomach      transfusion of blood or blood components       Family History   Problem Relation Age of Onset    Hypertension Father     Seizures Sister     Cancer Maternal Grandmother     Cancer Maternal Aunt      Social History     Tobacco Use    Smoking status: Current Every Day Smoker     Packs/day: 0.25     Years: 13.00     Pack years: 3.25     Types: Cigarettes    Smokeless tobacco: Never Used   Substance Use Topics    Alcohol use: Yes     Alcohol/week: 4.0 standard drinks     Types: 4 Cans of beer per week     Comment: weekends    Drug use: No        Review of  Systems:  All 10 ROS negative except that which is indicated in the HPI    OBJECTIVE:     Vital Signs:  Temp: 98.4 °F (36.9 °C) (07/15/22 0813)  Pulse: 77 (07/15/22 0813)  Resp: 16 (07/15/22 0813)  BP: 133/77 (07/15/22 0813)  SpO2: 100 % (07/15/22 0813)    Physical Exam:  General: well developed, well nourished, no distress  HEENT: NCAT, EOMI  Neck: supple, symmetrical  Lungs: Normal WOB, No SOB  Cardiovascular: regular rate  Abdomen: soft, nondistended, nontender to palpation  Skin: Skin color, texture, turgor normal. No rashes or lesions  Musculoskeletal:no clubbing, cyanosis, no deformities  Neurologic: No focal numbness or weakness  Psych/Behavioral:  Alert and oriented, appropriate affect.      ASSESSMENT:     31M with crohns on Stellara since April 2022    PLAN:     colonoscopy to monitor disease progression and evaluate for remission.      Yennifer Yeager MD   [employer] General Surgery, PGY-2

## 2022-07-15 NOTE — ANESTHESIA POSTPROCEDURE EVALUATION
Anesthesia Post Evaluation    Patient: Tee Corrales    Procedure(s) Performed: Procedure(s) (LRB):  COLONOSCOPY, WITH 1 OR MORE BIOPSIES (Left)    Final Anesthesia Type: general      Patient location during evaluation: GI PACU  Patient participation: Yes- Able to Participate  Level of consciousness: awake and alert  Post-procedure vital signs: reviewed and stable  Pain management: adequate  Airway patency: patent    PONV status at discharge: No PONV  Anesthetic complications: no      Cardiovascular status: hemodynamically stable  Respiratory status: unassisted and room air  Follow-up not needed.

## 2022-07-15 NOTE — TRANSFER OF CARE
Anesthesia Transfer of Care Note    Patient: Tee Corrales    Procedure(s) Performed: Procedure(s) (LRB):  COLONOSCOPY, WITH 1 OR MORE BIOPSIES (Left)    Patient location: GI    Anesthesia Type: general    Post pain: adequate analgesia    Post assessment: no apparent anesthetic complications and tolerated procedure well    Post vital signs: stable    Level of consciousness: awake    Nausea/Vomiting: no nausea/vomiting    Complications: none    Transfer of care protocol was followed

## 2022-07-18 VITALS
WEIGHT: 156.31 LBS | DIASTOLIC BLOOD PRESSURE: 74 MMHG | BODY MASS INDEX: 22.38 KG/M2 | TEMPERATURE: 98 F | RESPIRATION RATE: 16 BRPM | HEART RATE: 59 BPM | OXYGEN SATURATION: 100 % | SYSTOLIC BLOOD PRESSURE: 110 MMHG | HEIGHT: 70 IN

## 2022-07-18 LAB
ESTROGEN SERPL-MCNC: NORMAL PG/ML
INSULIN SERPL-ACNC: NORMAL U[IU]/ML
LAB AP CLINICAL INFORMATION: NORMAL
LAB AP GROSS DESCRIPTION: NORMAL
LAB AP REPORT FOOTNOTES: NORMAL
T3RU NFR SERPL: NORMAL %

## 2022-11-15 ENCOUNTER — OFFICE VISIT (OUTPATIENT)
Dept: GASTROENTEROLOGY | Facility: CLINIC | Age: 32
End: 2022-11-15
Payer: MEDICAID

## 2022-11-15 VITALS
SYSTOLIC BLOOD PRESSURE: 123 MMHG | WEIGHT: 162 LBS | RESPIRATION RATE: 18 BRPM | HEART RATE: 78 BPM | BODY MASS INDEX: 22.68 KG/M2 | TEMPERATURE: 98 F | DIASTOLIC BLOOD PRESSURE: 82 MMHG | HEIGHT: 71 IN

## 2022-11-15 DIAGNOSIS — E55.9 VITAMIN D DEFICIENCY: ICD-10-CM

## 2022-11-15 DIAGNOSIS — Z72.0 TOBACCO USE: ICD-10-CM

## 2022-11-15 DIAGNOSIS — K50.80 CROHN'S DISEASE OF BOTH SMALL AND LARGE INTESTINE WITHOUT COMPLICATION: ICD-10-CM

## 2022-11-15 PROCEDURE — 90732 PPSV23 VACC 2 YRS+ SUBQ/IM: CPT | Mod: PBBFAC

## 2022-11-15 PROCEDURE — 99214 OFFICE O/P EST MOD 30 MIN: CPT | Mod: PBBFAC | Performed by: INTERNAL MEDICINE

## 2022-11-15 PROCEDURE — 90471 IMMUNIZATION ADMIN: CPT | Mod: PBBFAC

## 2022-11-15 RX ORDER — ERGOCALCIFEROL 1.25 MG/1
50000 CAPSULE ORAL
Qty: 12 CAPSULE | Refills: 1 | Status: SHIPPED | OUTPATIENT
Start: 2022-11-15 | End: 2023-02-01

## 2022-11-15 RX ADMIN — PNEUMOCOCCAL VACCINE POLYVALENT 0.5 ML
25; 25; 25; 25; 25; 25; 25; 25; 25; 25; 25; 25; 25; 25; 25; 25; 25; 25; 25; 25; 25; 25; 25 INJECTION, SOLUTION INTRAMUSCULAR; SUBCUTANEOUS at 02:11

## 2022-11-15 NOTE — ASSESSMENT & PLAN NOTE
Diagnosed March 2017.  Remicade infusions x 4 in 2017  Off therapy x years  November 2021: Colonoscopy: Active disease in TI and colon  Fecal calprotectin 8128  HS CRP 13.46   Started Stelara in December 2021  Uste trough 3.9, Ab < 40 on 90 mg every 8 weeks  July 2022 Colonoscopy: no endoscopic inflammation  CRP: 3.8 (normal)  Continue Stelara 90 mg every 8 weeks  Recheck fecal calprotectin now and every 3-6 months  Vitamin D replacement  Pneumovax today

## 2022-11-15 NOTE — PROGRESS NOTES
Inflammatory Bowel Disease        Established Patient Note         TODAY'S VISIT DATE:  11/15/2022  The patient's last visit with me was on 5/10/2022.     PCP: Primary Doctor No      Referring MD:   No ref. provider found    History of Present Illness:  Mr. Corrales is a 32 year old AAM with Crohn's ileocolitis on Stelara 90mg every 8 weeks here for follow-up.    He was diagnosed in March 2017 after experiencing several months of diarrhea, 100 lb weight loss, rectal bleeding, anemia, and abdominal pain.    March 13, 2017 Colonoscopy (Dr. Whitaker): normal SHAYY, normal rectum. Erosions, erythema in the TI, IC valve. Erosions, ulcers and edema in cobblestone appearance from sigmoid to ascending colon.  March 13, 2017 EGD (Dr. Whitaker): patchy areas of erythema and raised swelling in the antrum    C diff PCR positive Jan-March 2017 -- treated per notes    He was started on Remicade in May 2017. He did not receive standard loading doses. He received 4 q8 weeks doses until September 2017 and then did not receive any further infusions (he states they were stopped, documented that he no showed). Regardless he felt better with infusions and went until July/August 2021 without any significant symptoms except occasional diarrhea with dietary indiscretion. His weight improved following infusions and no abd pain.     He was incarcerated in 2018 and then for 90 days in 2021, released around September 2021.    In August 2021 he began with diarrhea, intermittent lower abdominal pain, night sweats, and weight loss. He lost approximately 60 pounds since symptoms started. He was seen by GI in [place] during his incarceration but no work-up was performed prior to his release.    November 2021 Colonoscopy:  scattered shallow and deep serpentine ulcerations in the TI.  From the rectosigmoid to the cecum there was inflammation with confluent, deep and serpentine ulcerations.  Fecal calprotectin 8128    Started  Stelara in December 2021.      Clinical improvement of symptoms on medication.    February 10, 2022: Ustek level: 3.9 mcg/mL    July 2022: Colonoscopy: endoscopic resolution of TI and colonic inflammation.  Scarring, pseudopolyps, altered vascularity in the colon.  Path: inactive chronic colitis     Today he reports doing well.  He has gained 40 lbs.  He is having 2-3 BMs a day still soft without any blood.  No abdominal pain.  When its gets closer to his injection he experiences some vague abdominal pain.     He is smoking 2 cigars a day.  He is not drinking any alcohol mainly due to his GI symptoms. He denies any present or past history of marijuana or recreational drug use. He denies any family history of colon polyps, colon cancer, ulcerative colitis, Crohn's disease or other GI illnesses.        IBD History:   IBD disease: Crohn's disease   Disease location: Ileum, colon   Disease behavior: non penetrating, non stricturing        Current IBD Therapy:  Stelara 90mg every 8 weeks (December 2021 - present)     Therapeutic Drug Monitoring Labs:  February 10, 2022: Ustek level: 3.9 mcg/mL, Ab< 40     Prior IBD Therapies:  Remicade infusions x 4 (May 2017-Sept 2017)        Pertinent Endoscopy/Imaging:  March 13, 2017 Colonoscopy (Dr. Whitaker): normal SHAYY, normal rectum. Erosions, erythema in the TI, IC valve. Erosions, ulcers and edema in cobblestone appearance from sigmoid to ascending colon.    March 13, 2017 EGD (Dr. Whitaker): patchy areas of erythema and raised swelling in the antrum     November 24, 2021: Colonoscopy: scattered shallow and deep serpentine ulcerations in the TI.  From the rectosigmoid to the cecum there was inflammation with confluent, deep and serpentine ulcerations.  Pathology: colon: moderate active chronic colitis, rectum: mild chronic inactive colitis    November 2021: Fecal calprotectin: 8128    July 15, 2022: Colonoscopy: Normal TI, no inflammation in the colon.  Scattered mucosal changes  with altered vascularity, pseudopolyps, scarring from sigmoid to cecum.  One aphthous ulcer in the rectum o/w normal rectum.  Path: TI normal, inactive chronic colitis in the colon     Prior Pertinent Surgeries:   None     Complications:   None     Extraintestinal Manifestations:  None    Review of Systems   Constitutional:  Negative for appetite change and unexpected weight change.   HENT:  Negative for trouble swallowing.    Respiratory: Negative.     Cardiovascular: Negative.    Gastrointestinal:  Negative for abdominal pain, blood in stool, change in bowel habit, constipation, diarrhea and change in bowel habit.   Musculoskeletal: Negative.    Neurological: Negative.    Hematological: Negative.    Psychiatric/Behavioral: Negative.     All other systems reviewed and are negative.       Medical/Surgical History:   Past Medical History:   Diagnosis Date    Crohn's disease of both small and large intestine     Heart murmur     Irritable bowel syndrome (IBS)      Past Surgical History:   Procedure Laterality Date    COLONOSCOPY      COLONOSCOPY W/ BIOPSIES      ESOPHAGOGASTRODUODENOSCOPY      ESOPHAGOGASTRODUODENOSCOPY N/A     transfusion of blood or blood components           Family History:   Family History   Problem Relation Age of Onset    Hypertension Father     Seizures Sister     Cancer Maternal Grandmother     Cancer Maternal Aunt         Social History:   Social History     Socioeconomic History    Marital status: Single   Tobacco Use    Smoking status: Every Day     Packs/day: 0.25     Years: 13.00     Pack years: 3.25     Types: Cigars, Cigarettes    Smokeless tobacco: Never   Substance and Sexual Activity    Alcohol use: Yes     Alcohol/week: 6.0 standard drinks     Types: 6 Cans of beer per week     Comment: weekends    Drug use: No        Review of patient's allergies indicates:  No Known Allergies    Current Medications:   Outpatient Medications Marked as Taking for the 11/15/22 encounter (Office  "Visit) with Brooke Correa MD   Medication Sig Dispense Refill    ustekinumab (STELARA) 90 mg/mL Syrg syringe Inject 1 mL into the skin every 8 weeks.          Vital Signs:  /82 (BP Location: Left arm, Patient Position: Sitting, BP Method: Medium (Automatic))   Pulse 78   Temp 98.3 °F (36.8 °C) (Oral)   Resp 18   Ht 5' 11.26" (1.81 m)   Wt 73.5 kg (162 lb)   BMI 22.43 kg/m²      Physical Exam  Vitals reviewed.   Constitutional:       Appearance: Normal appearance.   HENT:      Head: Normocephalic and atraumatic.      Mouth/Throat:      Mouth: Mucous membranes are moist.   Eyes:      Extraocular Movements: Extraocular movements intact.      Conjunctiva/sclera: Conjunctivae normal.   Cardiovascular:      Rate and Rhythm: Normal rate and regular rhythm.   Pulmonary:      Effort: Pulmonary effort is normal.      Breath sounds: Normal breath sounds.   Abdominal:      General: Bowel sounds are normal.      Palpations: Abdomen is soft.      Tenderness: There is no abdominal tenderness.   Musculoskeletal:      Cervical back: Normal range of motion.   Skin:     General: Skin is warm and dry.   Neurological:      General: No focal deficit present.      Mental Status: He is alert and oriented to person, place, and time.   Psychiatric:         Mood and Affect: Mood normal.         Behavior: Behavior normal.       Labs: Reviewed  Hgb   Date Value Ref Range Status   07/15/2022 14.2 14.0 - 18.0 gm/dL Final     Albumin Level   Date Value Ref Range Status   07/15/2022 3.8 3.5 - 5.0 gm/dL Final     Iron Level   Date Value Ref Range Status   07/15/2022 90 65 - 175 ug/dL Final     Ferritin Level   Date Value Ref Range Status   07/15/2022 67.47 21.81 - 274.66 ng/mL Final     Folate Level   Date Value Ref Range Status   07/15/2022 9.9 7.0 - 31.4 ng/mL Final     Vitamin B12 Level   Date Value Ref Range Status   11/15/2021 485 213 - 816 pg/mL Final     Vit D 25 OH   Date Value Ref Range Status   07/15/2022 27.0 (L) 30.0 - " 80.0 ng/mL Final     C-Reactive Protein   Date Value Ref Range Status   07/15/2022 3.80 <5.00 mg/L Final     Hepatitis B Surface Antigen   Date Value Ref Range Status   11/15/2021 Nonreactive  Final      T spot: Negative 11/15/2021  Fecal calprotectin: 8128 (Nov 2021)        Assessment/Plan:    Problem List Items Addressed This Visit          Endocrine    Vitamin D deficiency     Vitamin D: 27  Ergocalciferol 50,000 IU weekly x 12 weeks  Then Calcium 7782-7350 mg daily and vitamin D 800 IU daily            GI    Crohn's disease of both small and large intestine without complication      Diagnosed March 2017.  Remicade infusions x 4 in 2017  Off therapy x years  November 2021: Colonoscopy: Active disease in TI and colon  Fecal calprotectin 8128  HS CRP 13.46   Started Stelara in December 2021  Uste trough 3.9, Ab < 40 on 90 mg every 8 weeks  July 2022 Colonoscopy: no endoscopic inflammation  CRP: 3.8 (normal)  Continue Stelara 90 mg every 8 weeks  Recheck fecal calprotectin now and every 3-6 months  Vitamin D replacement  Pneumovax today                  Relevant Medications    ergocalciferol (ERGOCALCIFEROL) 50,000 unit Cap    Other Relevant Orders    Calprotectin, Stool       Other    Tobacco use     Counseled on complete tobacco cessation              HEALTH MAINTENANCE:     Vaccinations:    Influenza (inactive):  oct 2022  PCV 13 (Prevnar): May 10, 2022  PPSV 23 (Pneumovax): November 2022, repeat 5 years later and then after age 64 yo  Tetanus (TdaP): March 2017, recommended every 10 years  HPV (males and females ages 11-44 yo): N/A  Meningococcal: No risk factors  Hepatitis B: not immune  Hepatitis A:  not immune   MMR (live vaccine): UTD, will check immunity          Chickenpox status/Varicella (live vaccine): had chickenpox as a child  Shingrix: recommended   COVID-19: received per patient x 2      Colorectal cancer risk:  Risk factors: > 8 years of disease, > 1/3 colon involved  - Distribution of colonic  disease: > 1/3 of colon  - Year of symptom onset: 2017  - Colonoscopy for surveillance starting 2025    Ophthalmologic exam recommended yearly  Dermatologic exam recommended yearly due to risk of skin cancer with IBD/meds    Bone health:  Calcium 6654-3913 mg daily and vitamin D 800 IU daily  Risk factors for osteopenia/osteoporosis:  Vitamin D: 27  Ergocalciferol 50,000 IU weekly x 12 weeks  DEXA scan: will need baseline      Smoking status: current smoker      Follow up: 6 months

## 2022-11-15 NOTE — ASSESSMENT & PLAN NOTE
Vitamin D: 27  Ergocalciferol 50,000 IU weekly x 12 weeks  Then Calcium 0176-8223 mg daily and vitamin D 800 IU daily

## 2023-11-16 DIAGNOSIS — K50.80 CROHN'S DISEASE OF BOTH SMALL AND LARGE INTESTINE WITHOUT COMPLICATION: Primary | ICD-10-CM

## 2023-11-29 ENCOUNTER — TELEPHONE (OUTPATIENT)
Dept: GASTROENTEROLOGY | Facility: CLINIC | Age: 33
End: 2023-11-29
Payer: MEDICAID

## 2023-11-29 NOTE — TELEPHONE ENCOUNTER
----- Message from Araceli Long RN sent at 11/29/2023 11:29 AM CST -----  Regarding: Patient needs refill of Crohn's medication  Patient came up to the unit requesting a refill of medication for his Crohn's as he was incarcerated. He is currently on Azathioprine 50mg with 15 days supply from the retirement.    Thanks,     Araceli

## 2023-11-29 NOTE — TELEPHONE ENCOUNTER
----- Message from Araceli Long RN sent at 11/29/2023 11:29 AM CST -----  Regarding: Patient needs refill of Crohn's medication  Patient came up to the unit requesting a refill of medication for his Crohn's as he was incarcerated. He is currently on Azathioprine 50mg with 15 days supply from the nursing home.    Thanks,     Araceli     Patient requests all Lab, Cardiology, and Radiology Results on their Discharge Instructions

## 2024-03-06 ENCOUNTER — TELEPHONE (OUTPATIENT)
Dept: GASTROENTEROLOGY | Facility: CLINIC | Age: 34
End: 2024-03-06
Payer: MEDICAID

## 2024-03-06 NOTE — TELEPHONE ENCOUNTER
Spoke with pt. Advised that we cannot Rx Stelara since he has not had an injection in 1yr. Last office visit 11/2022. Verbalized understanding.   Currently having 3 soft/loose BM's/d, denies blood, occasioanl N/V but attributes this to eating a large quantity of food. Advised him to call me with any worsening symptoms. Verbalized understanding.

## 2024-03-06 NOTE — TELEPHONE ENCOUNTER
----- Message from Kaley Montoya MA sent at 3/5/2024  3:17 PM CST -----  Regarding: FW: Medication Refill    ----- Message -----  From: Kathleen Brown  Sent: 3/5/2024  12:01 PM CST  To: [facility] Clinical Support Staff  Subject: Medication Refill                                Pt is in need of his STELARA INJECTIONS. Pt needs it sent to [facility]. Pt can be reached at [phone]    Thank You

## 2024-04-23 ENCOUNTER — OFFICE VISIT (OUTPATIENT)
Dept: GASTROENTEROLOGY | Facility: CLINIC | Age: 34
End: 2024-04-23
Payer: MEDICAID

## 2024-04-23 VITALS
HEIGHT: 71 IN | HEART RATE: 83 BPM | RESPIRATION RATE: 12 BRPM | SYSTOLIC BLOOD PRESSURE: 118 MMHG | WEIGHT: 165 LBS | TEMPERATURE: 98 F | BODY MASS INDEX: 23.1 KG/M2 | DIASTOLIC BLOOD PRESSURE: 74 MMHG

## 2024-04-23 DIAGNOSIS — K50.80 CROHN'S DISEASE OF BOTH SMALL AND LARGE INTESTINE WITHOUT COMPLICATION: ICD-10-CM

## 2024-04-23 LAB
ALBUMIN SERPL-MCNC: 4.1 G/DL (ref 3.5–5)
ALBUMIN/GLOB SERPL: 1 RATIO (ref 1.1–2)
ALP SERPL-CCNC: 99 UNIT/L (ref 40–150)
ALT SERPL-CCNC: 10 UNIT/L (ref 0–55)
AST SERPL-CCNC: 19 UNIT/L (ref 5–34)
BASOPHILS # BLD AUTO: 0.05 X10(3)/MCL
BASOPHILS NFR BLD AUTO: 0.8 %
BILIRUB SERPL-MCNC: 0.4 MG/DL
BUN SERPL-MCNC: 8 MG/DL (ref 8.9–20.6)
CALCIUM SERPL-MCNC: 9.7 MG/DL (ref 8.4–10.2)
CHLORIDE SERPL-SCNC: 104 MMOL/L (ref 98–107)
CO2 SERPL-SCNC: 25 MMOL/L (ref 22–29)
CREAT SERPL-MCNC: 1.21 MG/DL (ref 0.73–1.18)
CRP SERPL-MCNC: 13.7 MG/L
DEPRECATED CALCIDIOL+CALCIFEROL SERPL-MC: 12.7 NG/ML (ref 30–80)
EOSINOPHIL # BLD AUTO: 0.18 X10(3)/MCL (ref 0–0.9)
EOSINOPHIL NFR BLD AUTO: 3 %
ERYTHROCYTE [DISTWIDTH] IN BLOOD BY AUTOMATED COUNT: 15.9 % (ref 11.5–17)
FERRITIN SERPL-MCNC: 33.3 NG/ML (ref 21.81–274.66)
FOLATE SERPL-MCNC: 7.4 NG/ML (ref 7–31.4)
GFR SERPLBLD CREATININE-BSD FMLA CKD-EPI: >60 MLS/MIN/1.73/M2
GLOBULIN SER-MCNC: 4.3 GM/DL (ref 2.4–3.5)
GLUCOSE SERPL-MCNC: 83 MG/DL (ref 74–100)
HBV CORE AB SERPL QL IA: NONREACTIVE
HBV SURFACE AG SERPL QL IA: NONREACTIVE
HCT VFR BLD AUTO: 44.3 % (ref 42–52)
HCV AB SERPL QL IA: NONREACTIVE
HGB BLD-MCNC: 14.8 G/DL (ref 14–18)
IMM GRANULOCYTES # BLD AUTO: 0.01 X10(3)/MCL (ref 0–0.04)
IMM GRANULOCYTES NFR BLD AUTO: 0.2 %
IRON SATN MFR SERPL: 25 % (ref 20–50)
IRON SERPL-MCNC: 107 UG/DL (ref 65–175)
LYMPHOCYTES # BLD AUTO: 1.48 X10(3)/MCL (ref 0.6–4.6)
LYMPHOCYTES NFR BLD AUTO: 24.7 %
MCH RBC QN AUTO: 26 PG (ref 27–31)
MCHC RBC AUTO-ENTMCNC: 33.4 G/DL (ref 33–36)
MCV RBC AUTO: 77.7 FL (ref 80–94)
MONOCYTES # BLD AUTO: 0.27 X10(3)/MCL (ref 0.1–1.3)
MONOCYTES NFR BLD AUTO: 4.5 %
NEUTROPHILS # BLD AUTO: 4 X10(3)/MCL (ref 2.1–9.2)
NEUTROPHILS NFR BLD AUTO: 66.8 %
NRBC BLD AUTO-RTO: 0 %
PLATELET # BLD AUTO: 320 X10(3)/MCL (ref 130–400)
PMV BLD AUTO: 9.9 FL (ref 7.4–10.4)
POTASSIUM SERPL-SCNC: 3.8 MMOL/L (ref 3.5–5.1)
PROT SERPL-MCNC: 8.4 GM/DL (ref 6.4–8.3)
RBC # BLD AUTO: 5.7 X10(6)/MCL (ref 4.7–6.1)
SODIUM SERPL-SCNC: 138 MMOL/L (ref 136–145)
TIBC SERPL-MCNC: 324 UG/DL (ref 69–240)
TIBC SERPL-MCNC: 431 UG/DL (ref 250–450)
TRANSFERRIN SERPL-MCNC: 401 MG/DL (ref 174–364)
VIT B12 SERPL-MCNC: 514 PG/ML (ref 213–816)
WBC # SPEC AUTO: 5.99 X10(3)/MCL (ref 4.5–11.5)

## 2024-04-23 PROCEDURE — 99214 OFFICE O/P EST MOD 30 MIN: CPT | Mod: PBBFAC | Performed by: INTERNAL MEDICINE

## 2024-04-23 PROCEDURE — 36415 COLL VENOUS BLD VENIPUNCTURE: CPT | Performed by: INTERNAL MEDICINE

## 2024-04-23 PROCEDURE — 82607 VITAMIN B-12: CPT | Performed by: INTERNAL MEDICINE

## 2024-04-23 PROCEDURE — 86803 HEPATITIS C AB TEST: CPT | Performed by: INTERNAL MEDICINE

## 2024-04-23 PROCEDURE — 82306 VITAMIN D 25 HYDROXY: CPT | Performed by: INTERNAL MEDICINE

## 2024-04-23 PROCEDURE — 82746 ASSAY OF FOLIC ACID SERUM: CPT | Performed by: INTERNAL MEDICINE

## 2024-04-23 PROCEDURE — 87340 HEPATITIS B SURFACE AG IA: CPT | Performed by: INTERNAL MEDICINE

## 2024-04-23 PROCEDURE — 85025 COMPLETE CBC W/AUTO DIFF WBC: CPT | Performed by: INTERNAL MEDICINE

## 2024-04-23 PROCEDURE — 86480 TB TEST CELL IMMUN MEASURE: CPT | Performed by: INTERNAL MEDICINE

## 2024-04-23 PROCEDURE — 84630 ASSAY OF ZINC: CPT | Performed by: INTERNAL MEDICINE

## 2024-04-23 PROCEDURE — 86704 HEP B CORE ANTIBODY TOTAL: CPT | Performed by: INTERNAL MEDICINE

## 2024-04-23 PROCEDURE — 86140 C-REACTIVE PROTEIN: CPT | Performed by: INTERNAL MEDICINE

## 2024-04-23 PROCEDURE — 82728 ASSAY OF FERRITIN: CPT | Performed by: INTERNAL MEDICINE

## 2024-04-23 PROCEDURE — 80053 COMPREHEN METABOLIC PANEL: CPT | Performed by: INTERNAL MEDICINE

## 2024-04-23 PROCEDURE — 83540 ASSAY OF IRON: CPT | Performed by: INTERNAL MEDICINE

## 2024-04-23 NOTE — PROGRESS NOTES
Inflammatory Bowel Disease        Established Patient Note         TODAY'S VISIT DATE:  4/23/2024  The patient's last visit with me was on 5/10/2022.     PCP: Eliza, Primary Doctor      Referring MD:   Paper Order    History of Present Illness:  Mr. Corrales is a 32 year old AAM with Crohn's ileocolitis on Stelara 90mg every 8 weeks here for follow-up.    He was diagnosed in March 2017 after experiencing several months of diarrhea, 100 lb weight loss, rectal bleeding, anemia, and abdominal pain.    March 13, 2017 Colonoscopy (Dr. Whitaker): normal SHAYY, normal rectum. Erosions, erythema in the TI, IC valve. Erosions, ulcers and edema in cobblestone appearance from sigmoid to ascending colon.  March 13, 2017 EGD (Dr. Whitaker): patchy areas of erythema and raised swelling in the antrum    C diff PCR positive Jan-March 2017 -- treated per notes    He was started on Remicade in May 2017. He did not receive standard loading doses. He received 4 q8 weeks doses until September 2017 and then did not receive any further infusions (he states they were stopped, documented that he no showed). Regardless he felt better with infusions and went until July/August 2021 without any significant symptoms except occasional diarrhea with dietary indiscretion. His weight improved following infusions and no abd pain.     He was incarcerated in 2018 and then for 90 days in 2021, released around September 2021.    In August 2021 he began with diarrhea, intermittent lower abdominal pain, night sweats, and weight loss. He lost approximately 60 pounds since symptoms started. He was seen by GI in [place] during his incarceration but no work-up was performed prior to his release.    November 2021 Colonoscopy:  scattered shallow and deep serpentine ulcerations in the TI.  From the rectosigmoid to the cecum there was inflammation with confluent, deep and serpentine ulcerations.  Fecal calprotectin 8128    Started Stelara in  December 2021.      Clinical improvement of symptoms on medication.    February 10, 2022: Ustek level: 3.9 mcg/mL    July 2022: Colonoscopy: endoscopic resolution of TI and colonic inflammation.  Scarring, pseudopolyps, altered vascularity in the colon.  Path: inactive chronic colitis    Last seen in November 2022 and was doing well.  Incarcerated from January 2023 - November 2023 and during this time was off Stelara.  He was given azathioprine 50 mg and intermittent steroids.     Today he reports intermittent abdominal cramping with daily bloating.  He is having 3-4 loose stools without any rectal bleeding or melena.  No nocturnal.  He has some urgency but no tenesmus.  His weight is stable.  Appetite is good. No joint pain, rashes, or eye problems.      He is smoking 5-6 cigarettes a day.  He drinks alcohol on weekends.  He denies any present or past history of marijuana or recreational drug use. He denies any family history of colon polyps, colon cancer, ulcerative colitis, Crohn's disease or other GI illnesses.  Works full time at [employer].         IBD History:   IBD disease: Crohn's disease   Disease location: Ileum, colon   Disease behavior: non penetrating, non stricturing        Current IBD Therapy:  Stelara 90mg every 8 weeks (December 2021 - present)     Therapeutic Drug Monitoring Labs:  February 10, 2022: Ustek level: 3.9 mcg/mL, Ab< 40     Prior IBD Therapies:  Remicade infusions x 4 (May 2017-Sept 2017)  Prednisone   Azathioprine        Pertinent Endoscopy/Imaging:  March 13, 2017 Colonoscopy (Dr. Whitaker): normal SHAYY, normal rectum. Erosions, erythema in the TI, IC valve. Erosions, ulcers and edema in cobblestone appearance from sigmoid to ascending colon.    March 13, 2017 EGD (Dr. Whitaker): patchy areas of erythema and raised swelling in the antrum     November 24, 2021: Colonoscopy: scattered shallow and deep serpentine ulcerations in the TI.  From the rectosigmoid to the cecum there was  inflammation with confluent, deep and serpentine ulcerations.  Pathology: colon: moderate active chronic colitis, rectum: mild chronic inactive colitis    November 2021: Fecal calprotectin: 8128    July 15, 2022: Colonoscopy: Normal TI, no inflammation in the colon.  Scattered mucosal changes with altered vascularity, pseudopolyps, scarring from sigmoid to cecum.  One aphthous ulcer in the rectum o/w normal rectum.  Path: TI normal, inactive chronic colitis in the colon     Prior Pertinent Surgeries:   None     Complications:   None     Extraintestinal Manifestations:  None    Review of Systems   Constitutional:  Negative for appetite change and unexpected weight change.   HENT:  Negative for trouble swallowing.    Respiratory: Negative.     Cardiovascular: Negative.    Gastrointestinal:  Negative for abdominal pain, blood in stool, change in bowel habit, constipation and diarrhea.   Musculoskeletal: Negative.    Neurological: Negative.    Hematological: Negative.    Psychiatric/Behavioral: Negative.     All other systems reviewed and are negative.         Medical/Surgical History:   Past Medical History:   Diagnosis Date    Crohn's disease of both small and large intestine     Heart murmur     Irritable bowel syndrome (IBS)      Past Surgical History:   Procedure Laterality Date    COLONOSCOPY      COLONOSCOPY W/ BIOPSIES      ESOPHAGOGASTRODUODENOSCOPY      ESOPHAGOGASTRODUODENOSCOPY N/A     transfusion of blood or blood components           Family History:   Family History   Problem Relation Name Age of Onset    Hypertension Father      Seizures Sister      Cancer Maternal Grandmother      Cancer Maternal Aunt          Social History:   Social History     Socioeconomic History    Marital status: Single   Tobacco Use    Smoking status: Every Day     Current packs/day: 0.25     Average packs/day: 0.3 packs/day for 13.0 years (3.3 ttl pk-yrs)     Types: Cigars, Cigarettes    Smokeless tobacco: Never   Substance and  "Sexual Activity    Alcohol use: Yes     Alcohol/week: 6.0 standard drinks of alcohol     Types: 6 Cans of beer per week     Comment: weekends    Drug use: No        Review of patient's allergies indicates:  No Known Allergies    Current Medications:   Current Outpatient Medications   Medication Sig Dispense Refill    ustekinumab (STELARA) 90 mg/mL Syrg syringe Inject 1 mL into the skin every 8 weeks. (Patient not taking: Reported on 4/23/2024)       No current facility-administered medications for this visit.        Vital Signs:  /74 (BP Location: Left arm, Patient Position: Sitting, BP Method: Medium (Automatic))   Pulse 83   Temp 97.7 °F (36.5 °C) (Oral)   Resp 12   Ht 5' 11" (1.803 m)   Wt 74.8 kg (165 lb)   BMI 23.01 kg/m²      Physical Exam  Vitals reviewed.   Constitutional:       Appearance: Normal appearance.   HENT:      Head: Normocephalic and atraumatic.      Mouth/Throat:      Mouth: Mucous membranes are moist.   Eyes:      Extraocular Movements: Extraocular movements intact.      Conjunctiva/sclera: Conjunctivae normal.   Cardiovascular:      Rate and Rhythm: Normal rate and regular rhythm.   Pulmonary:      Effort: Pulmonary effort is normal.      Breath sounds: Normal breath sounds.   Abdominal:      General: Bowel sounds are normal.      Palpations: Abdomen is soft.      Tenderness: There is no abdominal tenderness.   Musculoskeletal:      Cervical back: Normal range of motion.   Skin:     General: Skin is warm and dry.   Neurological:      General: No focal deficit present.      Mental Status: He is alert and oriented to person, place, and time.   Psychiatric:         Mood and Affect: Mood normal.         Behavior: Behavior normal.         Labs: Reviewed  Hgb   Date Value Ref Range Status   07/15/2022 14.2 14.0 - 18.0 gm/dL Final     Albumin Level   Date Value Ref Range Status   07/15/2022 3.8 3.5 - 5.0 gm/dL Final     Iron Level   Date Value Ref Range Status   07/15/2022 90 65 - 175 " ug/dL Final     Ferritin Level   Date Value Ref Range Status   07/15/2022 67.47 21.81 - 274.66 ng/mL Final     Folate Level   Date Value Ref Range Status   07/15/2022 9.9 7.0 - 31.4 ng/mL Final     Vitamin B12 Level   Date Value Ref Range Status   11/15/2021 485 213 - 816 pg/mL Final     Vit D 25 OH   Date Value Ref Range Status   07/15/2022 27.0 (L) 30.0 - 80.0 ng/mL Final     C-Reactive Protein   Date Value Ref Range Status   07/15/2022 3.80 <5.00 mg/L Final     Hepatitis B Surface Antigen   Date Value Ref Range Status   11/15/2021 Nonreactive  Final      T spot: Negative 11/15/2021  Fecal calprotectin: 8128 (Nov 2021)        Assessment/Plan:    Problem List Items Addressed This Visit          GI    Crohn's disease of both small and large intestine without complication     Diagnosed March 2017.  Remicade infusions x 4 in 2017  Off therapy x years  November 2021: Colonoscopy: Active disease in TI and colon  Fecal calprotectin 8128  HS CRP 13.46   Started Stelara in December 2021  Uste trough 3.9, Ab < 40 on 90 mg every 8 weeks  July 2022 Colonoscopy: no endoscopic inflammation  CRP: 3.8 (normal)  Off therapy Jan 2023 - Nov 2023  Given azathioprine and steroids with persistent symptoms during this time.     Schedule colonoscopy  Labs and fecal calprotectin today  Tentative plan to restart Stelara, loading doses followed by maintenance dosing given prior treatment response pending colonoscopy findings.           Relevant Orders    CBC Auto Differential (Completed)    Comprehensive Metabolic Panel (Completed)    C-Reactive Protein (Completed)    Calprotectin, Stool    Iron and TIBC (Completed)    Ferritin (Completed)    Folate (Completed)    Vitamin B12 (Completed)    Vitamin D (Completed)    Zinc (Completed)    Quantiferon Gold TB (Completed)    Hepatitis B Surface Antigen (Completed)    Hepatitis C Antibody (Completed)    Hepatitis B Core Antibody, Total (Completed)    Case Request Endoscopy: COLONOSCOPY (Completed)          HEALTH MAINTENANCE:     Vaccinations:    Influenza (inactive):  oct 2022  PCV 13 (Prevnar): May 10, 2022  PPSV 23 (Pneumovax): November 2022, repeat 5 years later and then after age 66 yo  PCV 20 (prevnar): Due 2027  Tetanus (TdaP): March 2017, recommended every 10 years  HPV (males and females ages 11-46 yo): N/A  Meningococcal: No risk factors  Hepatitis B: not immune  Hepatitis A:  not immune   MMR (live vaccine): UTD          Chickenpox status/Varicella (live vaccine): had chickenpox as a child  Shingrix: recommended   COVID-19: received per patient x 2      Colorectal cancer risk:  Risk factors: > 8 years of disease, > 1/3 colon involved  - Distribution of colonic disease: > 1/3 of colon  - Year of symptom onset: 2017  - Colonoscopy for surveillance starting 2025    Ophthalmologic exam recommended yearly  Dermatologic exam recommended yearly due to risk of skin cancer with IBD/meds    Bone health:  Calcium 5837-3801 mg daily and vitamin D 800 IU daily  Risk factors for osteopenia/osteoporosis:  Vitamin D: 27, recheck today  Ergocalciferol 50,000 IU weekly x 12 weeks  DEXA scan: will need baseline      Smoking status: current smoker      Follow up: following colonoscopy

## 2024-04-24 RX ORDER — POLYETHYLENE GLYCOL 3350, SODIUM SULFATE, SODIUM CHLORIDE, POTASSIUM CHLORIDE, SODIUM ASCORBATE, AND ASCORBIC ACID 7.5-2.691G
2000 KIT ORAL SEE ADMIN INSTRUCTIONS
Qty: 1 KIT | Refills: 0 | Status: ON HOLD | OUTPATIENT
Start: 2024-04-24 | End: 2024-05-15 | Stop reason: HOSPADM

## 2024-04-25 LAB
GAMMA INTERFERON BACKGROUND BLD IA-ACNC: 0.01 IU/ML
M TB IFN-G BLD-IMP: NEGATIVE
M TB IFN-G CD4+ BCKGRND COR BLD-ACNC: 0 IU/ML
M TB IFN-G CD4+CD8+ BCKGRND COR BLD-ACNC: 0 IU/ML
MITOGEN IGNF BCKGRD COR BLD-ACNC: 7.81 IU/ML

## 2024-04-26 LAB — AR ZINC, SERUM/PLASMA: 81.4 UG/DL

## 2024-05-14 ENCOUNTER — ANESTHESIA EVENT (OUTPATIENT)
Dept: ENDOSCOPY | Facility: HOSPITAL | Age: 34
End: 2024-05-14
Payer: MEDICAID

## 2024-05-14 NOTE — ANESTHESIA PREPROCEDURE EVALUATION
"                                                                                                             05/14/2024  Tee Corrales is a 33 y.o., male for [facility]      Vitals:    05/15/24 1246   BP: 121/77   BP Location: Left arm   Patient Position: Lying   Pulse: 79   Resp: 20   Temp: 36.8 °C (98.3 °F)   TempSrc: Oral   SpO2: 98%   Weight: 73.5 kg (162 lb)   Height: 5' 11" (1.803 m)         Active Ambulatory Problems     Diagnosis Date Noted    Crohn's disease of both small and large intestine without complication 05/10/2022    Vitamin D deficiency 11/15/2022    Tobacco use 11/15/2022     Resolved Ambulatory Problems     Diagnosis Date Noted    No Resolved Ambulatory Problems     Past Medical History:   Diagnosis Date    Crohn's disease of both small and large intestine     Heart murmur     Irritable bowel syndrome (IBS)        Past Surgical History:   Procedure Laterality Date    COLONOSCOPY      COLONOSCOPY W/ BIOPSIES      ESOPHAGOGASTRODUODENOSCOPY      ESOPHAGOGASTRODUODENOSCOPY N/A     transfusion of blood or blood components         Lab Results   Component Value Date    WBC 5.99 04/23/2024    HGB 14.8 04/23/2024    HCT 44.3 04/23/2024     04/23/2024    ALT 10 04/23/2024    AST 19 04/23/2024     04/23/2024    K 3.8 04/23/2024    CREATININE 1.21 (H) 04/23/2024    BUN 8.0 (L) 04/23/2024    CO2 25 04/23/2024       No current facility-administered medications on file prior to encounter.     Current Outpatient Medications on File Prior to Encounter   Medication Sig Dispense Refill    polyethylene glycol (MOVIPREP) 100-7.5-2.691 gram solution Take 2,000 mLs by mouth As instructed (take as directed per instructions provided by GI clinic). 1 kit 0    ustekinumab (STELARA) 90 mg/mL Syrg syringe Inject 1 mL into the skin every 8 weeks. (Patient not taking: Reported on 4/23/2024)             Pre-op Assessment    I have reviewed the Patient Summary Reports.     I have reviewed the Nursing Notes. I " have reviewed the NPO Status.   I have reviewed the Medications.     Review of Systems  Anesthesia Hx:  No problems with previous Anesthesia   History of prior surgery of interest to airway management or planning:          Denies Family Hx of Anesthesia complications.    Denies Personal Hx of Anesthesia complications.                    Hematology/Oncology:  Hematology Normal   Oncology Normal                                   EENT/Dental:  EENT/Dental Normal           Cardiovascular:  Cardiovascular Normal                                            Pulmonary:  Pulmonary Normal                       Renal/:  Renal/ Normal                 Hepatic/GI:  Hepatic/GI Normal                 Musculoskeletal:  Musculoskeletal Normal                Neurological:  Neurology Normal                                      Endocrine:  Endocrine Normal            Dermatological:  Skin Normal    Psych:  Psychiatric Normal                    Physical Exam  General: Cooperative, Well nourished, Alert and Oriented    Airway:  Mallampati: II / III/ II  Mouth Opening: Normal  TM Distance: Normal  Tongue: Normal  Neck ROM: Normal ROM        Anesthesia Plan  Type of Anesthesia, risks & benefits discussed:    Anesthesia Type: Gen Natural Airway  Intra-op Monitoring Plan: Standard ASA Monitors  Post Op Pain Control Plan: IV/PO Opioids PRN  (medical reason for not using multimodal pain management)  Induction:  IV  Informed Consent: Informed consent signed with the Patient and all parties understand the risks and agree with anesthesia plan.  All questions answered. Patient consented to blood products? No  ASA Score: 2  Day of Surgery Review of History & Physical: H&P Update referred to the surgeon/provider.    Ready For Surgery From Anesthesia Perspective.     .

## 2024-05-15 ENCOUNTER — HOSPITAL ENCOUNTER (OUTPATIENT)
Facility: HOSPITAL | Age: 34
Discharge: HOME | End: 2024-05-15
Attending: INTERNAL MEDICINE | Admitting: INTERNAL MEDICINE
Payer: MEDICAID

## 2024-05-15 ENCOUNTER — ANESTHESIA (OUTPATIENT)
Dept: ENDOSCOPY | Facility: HOSPITAL | Age: 34
End: 2024-05-15
Payer: MEDICAID

## 2024-05-15 DIAGNOSIS — K50.80 CROHN'S DISEASE OF BOTH SMALL AND LARGE INTESTINE WITHOUT COMPLICATION: ICD-10-CM

## 2024-05-15 PROCEDURE — D9220A PRA ANESTHESIA: Mod: ,,, | Performed by: NURSE ANESTHETIST, CERTIFIED REGISTERED

## 2024-05-15 PROCEDURE — 37000008 HC ANESTHESIA 1ST 15 MINUTES: Performed by: INTERNAL MEDICINE

## 2024-05-15 PROCEDURE — 63600175 PHARM REV CODE 636 W HCPCS: Performed by: NURSE ANESTHETIST, CERTIFIED REGISTERED

## 2024-05-15 PROCEDURE — 63600175 PHARM REV CODE 636 W HCPCS: Performed by: SPECIALIST

## 2024-05-15 PROCEDURE — 45378 DIAGNOSTIC COLONOSCOPY: CPT | Performed by: INTERNAL MEDICINE

## 2024-05-15 PROCEDURE — 88305 TISSUE EXAM BY PATHOLOGIST: CPT | Mod: TC,91 | Performed by: INTERNAL MEDICINE

## 2024-05-15 PROCEDURE — 37000009 HC ANESTHESIA EA ADD 15 MINS: Performed by: INTERNAL MEDICINE

## 2024-05-15 PROCEDURE — 25000003 PHARM REV CODE 250: Performed by: NURSE ANESTHETIST, CERTIFIED REGISTERED

## 2024-05-15 PROCEDURE — 27201423 OPTIME MED/SURG SUP & DEVICES STERILE SUPPLY: Performed by: INTERNAL MEDICINE

## 2024-05-15 RX ORDER — DICYCLOMINE HYDROCHLORIDE 10 MG/1
10 CAPSULE ORAL EVERY 6 HOURS PRN
Qty: 45 CAPSULE | Refills: 3 | Status: SHIPPED | OUTPATIENT
Start: 2024-05-15 | End: 2024-11-11

## 2024-05-15 RX ORDER — LIDOCAINE HYDROCHLORIDE 20 MG/ML
INJECTION INTRAVENOUS
Status: DISCONTINUED | OUTPATIENT
Start: 2024-05-15 | End: 2024-05-15

## 2024-05-15 RX ORDER — PROPOFOL 10 MG/ML
VIAL (ML) INTRAVENOUS
Status: DISCONTINUED | OUTPATIENT
Start: 2024-05-15 | End: 2024-05-15

## 2024-05-15 RX ORDER — ERGOCALCIFEROL 1.25 MG/1
50000 CAPSULE ORAL
Qty: 12 CAPSULE | Refills: 1 | Status: SHIPPED | OUTPATIENT
Start: 2024-05-15 | End: 2024-10-24

## 2024-05-15 RX ORDER — SODIUM CHLORIDE, SODIUM LACTATE, POTASSIUM CHLORIDE, CALCIUM CHLORIDE 600; 310; 30; 20 MG/100ML; MG/100ML; MG/100ML; MG/100ML
INJECTION, SOLUTION INTRAVENOUS CONTINUOUS
Status: DISCONTINUED | OUTPATIENT
Start: 2024-05-15 | End: 2024-05-15 | Stop reason: HOSPADM

## 2024-05-15 RX ORDER — FERROUS SULFATE 324(65)MG
324 TABLET, DELAYED RELEASE (ENTERIC COATED) ORAL DAILY
Qty: 30 TABLET | Refills: 11 | Status: SHIPPED | OUTPATIENT
Start: 2024-05-15 | End: 2025-05-15

## 2024-05-15 RX ADMIN — PROPOFOL 150 MG: 10 INJECTION, EMULSION INTRAVENOUS at 01:05

## 2024-05-15 RX ADMIN — LIDOCAINE HYDROCHLORIDE 75 MG: 20 INJECTION INTRAVENOUS at 01:05

## 2024-05-15 RX ADMIN — PROPOFOL 50 MG: 10 INJECTION, EMULSION INTRAVENOUS at 01:05

## 2024-05-15 RX ADMIN — SODIUM CHLORIDE, POTASSIUM CHLORIDE, SODIUM LACTATE AND CALCIUM CHLORIDE: 600; 310; 30; 20 INJECTION, SOLUTION INTRAVENOUS at 01:05

## 2024-05-15 RX ADMIN — SODIUM CHLORIDE, POTASSIUM CHLORIDE, SODIUM LACTATE AND CALCIUM CHLORIDE: 600; 310; 30; 20 INJECTION, SOLUTION INTRAVENOUS at 12:05

## 2024-05-15 NOTE — TRANSFER OF CARE
"Anesthesia Transfer of Care Note    Patient: Tee Corrales    Procedure(s) Performed: Procedure(s) (LRB):  COLONOSCOPY, WITH 1 OR MORE BIOPSIES (N/A)    Patient location: GI    Anesthesia Type: general    Post pain: adequate analgesia    Post assessment: no apparent anesthetic complications    Post vital signs: stable    Level of consciousness: sedated    Nausea/Vomiting: no nausea/vomiting    Complications: none    Transfer of care protocol was followed      Last vitals: Visit Vitals  /77 (BP Location: Left arm, Patient Position: Lying)   Pulse 79   Temp 36.8 °C (98.3 °F) (Oral)   Resp 20   Ht 5' 11" (1.803 m)   Wt 73.5 kg (162 lb)   SpO2 98%   BMI 22.59 kg/m²     "

## 2024-05-15 NOTE — ANESTHESIA POSTPROCEDURE EVALUATION
Anesthesia Post Evaluation    Patient: Tee Corrales    Procedure(s) Performed: Procedure(s) (LRB):  COLONOSCOPY, WITH 1 OR MORE BIOPSIES (N/A)    Final Anesthesia Type: general      Patient location during evaluation: GI PACU  Patient participation: Yes- Able to Participate  Level of consciousness: awake and alert  Post-procedure vital signs: reviewed and stable  Pain management: adequate  Airway patency: patent    PONV status at discharge: No PONV  Anesthetic complications: no      Cardiovascular status: hemodynamically stable  Respiratory status: spontaneous ventilation and room air  Hydration status: euvolemic  Follow-up not needed.              Vitals Value Taken Time   /77 05/15/24 1246   Temp 36.8 °C (98.3 °F) 05/15/24 1246   Pulse 79 05/15/24 1246   Resp 20 05/15/24 1246   SpO2 98 % 05/15/24 1246         No case tracking events are documented in the log.      Pain/Jessica Score: No data recorded

## 2024-05-15 NOTE — PROVATION PATIENT INSTRUCTIONS
Discharge Summary/Instructions after an Endoscopic Procedure  Patient Name: Tee Corrales  Patient MRN: [id number]  Patient YOB: 1990  Wednesday, May 15, 2024  Brooke Correa MD  Dear patient,  As a result of recent federal legislation (The Federal Cures Act), you may   receive lab or pathology results from your procedure in your [facility]   account before your physician is able to contact you. Your physician or   their representative will relay the results to you with their   recommendations at their soonest availability.  Thank you,  RESTRICTIONS:  During your procedure today, you received medications for sedation.  These   medications may affect your judgment, balance and coordination.  Therefore,   for 24 hours, you have the following restrictions:   - DO NOT drive a car, operate machinery, make legal/financial decisions,   sign important papers or drink alcohol.    ACTIVITY:  Today: no heavy lifting, straining or running due to procedural   sedation/anesthesia.  The following day: return to full activity including work.  DIET:  Eat and drink normally unless instructed otherwise.     TREATMENT FOR COMMON SIDE EFFECTS:  - Mild abdominal pain, nausea, belching, bloating or excessive gas:  rest,   eat lightly and use a heating pad.  - Sore Throat: treat with throat lozenges and/or gargle with warm salt   water.  - Because air was used during the procedure, expelling large amounts of air   from your rectum or belching is normal.  - If a bowel prep was taken, you may not have a bowel movement for 1-3 days.    This is normal.  SYMPTOMS TO WATCH FOR AND REPORT TO YOUR PHYSICIAN:  1. Abdominal pain or bloating, other than gas cramps.  2. Chest pain.  3. Back pain.  4. Signs of infection such as: chills or fever occurring within 24 hours   after the procedure.  5. Rectal bleeding, which would show as bright red, maroon, or black stools.   (A tablespoon of blood from the rectum is not serious,  especially if   hemorrhoids are present.)  6. Vomiting.  7. Weakness or dizziness.  GO DIRECTLY TO THE NEAREST EMERGENCY ROOM IF YOU HAVE ANY OF THE FOLLOWING:      Difficulty breathing              Chills and/or fever over 101 F   Persistent vomiting and/or vomiting blood   Severe abdominal pain   Severe chest pain   Black, tarry stools   Bleeding- more than one tablespoon   Any other symptom or condition that you feel may need urgent attention  Your doctor recommends these additional instructions:  If any biopsies were taken, your doctors clinic will contact you in 1 to 2   weeks with any results.  - Patient has a contact number available for emergencies.  The signs and   symptoms of potential delayed complications were discussed with the   patient.  Return to normal activities tomorrow.  Written discharge   instructions were provided to the patient.   - Discharge patient to home.   - Resume previous diet.   - Continue present medications. Restart Stelara  - Await pathology results.   - Return to GI clinic.   - No recommendation at this time regarding repeat colonoscopy.  For questions, problems or results please call your physician - Brooke Correa MD at Work:  [phone], Work:  [phone].  [facility] , EMERGENCY ROOM PHONE NUMBER: [phone]  IF A COMPLICATION OR EMERGENCY SITUATION ARISES AND YOU ARE UNABLE TO REACH   YOUR PHYSICIAN - GO DIRECTLY TO THE EMERGENCY ROOM.  Brooke Correa MD  5/15/2024 2:38:12 PM  This report has been verified and signed electronically.  Dear patient,  As a result of recent federal legislation (The Federal Cures Act), you may   receive lab or pathology results from your procedure in your [facility]   account before your physician is able to contact you. Your physician or   their representative will relay the results to you with their   recommendations at their soonest availability.  Thank you,  PROVATION

## 2024-05-15 NOTE — H&P
Colonoscopy History and Physical    Patient Name: Tee Corrales  MRN: [id number]  : 1990  Date of Procedure:  5/15/2024  Referring Physician: Brooke Correa MD  Primary Physician: Eliza, Primary Doctor  Procedure Physician: Brooke Correa MD, MPH     Procedure - Colonoscopy  ASA - per anesthesia  Mallampati - per anesthesia  History of Anesthesia problems - no  Family history Anesthesia problems -  no   Plan of anesthesia - General    Diagnosis: Crohn's disease  Chief Complaint: Same as above    HPI: Patient is an 33 y.o. male is here for the above.     Mr. Corrales is a 32 year old AAM with Crohn's ileocolitis on Stelara 90mg every 8 weeks here for follow-up.    He was diagnosed in 2017 after experiencing several months of diarrhea, 100 lb weight loss, rectal bleeding, anemia, and abdominal pain.    2017 Colonoscopy (Dr. Whitaker): normal SHAYY, normal rectum. Erosions, erythema in the TI, IC valve. Erosions, ulcers and edema in cobblestone appearance from sigmoid to ascending colon.  2017 EGD (Dr. Whitaker): patchy areas of erythema and raised swelling in the antrum    C diff PCR positive -2017 -- treated per notes    He was started on Remicade in May 2017. He did not receive standard loading doses. He received 4 q8 weeks doses until 2017 and then did not receive any further infusions (he states they were stopped, documented that he no showed). Regardless he felt better with infusions and went until 2021 without any significant symptoms except occasional diarrhea with dietary indiscretion. His weight improved following infusions and no abd pain.     He was incarcerated in  and then for 90 days in , released around 2021.    In 2021 he began with diarrhea, intermittent lower abdominal pain, night sweats, and weight loss. He lost approximately 60 pounds since symptoms started. He was seen by GI in [place] during his  incarceration but no work-up was performed prior to his release.     November 2021 Colonoscopy:  scattered shallow and deep serpentine ulcerations in the TI.  From the rectosigmoid to the cecum there was inflammation with confluent, deep and serpentine ulcerations.  Fecal calprotectin 8128     Started Stelara in December 2021.       Clinical improvement of symptoms on medication.    February 10, 2022: Ustek level: 3.9 mcg/mL     July 2022: Colonoscopy: endoscopic resolution of TI and colonic inflammation.  Scarring, pseudopolyps, altered vascularity in the colon.  Path: inactive chronic colitis     Last seen in November 2022 and was doing well.  Incarcerated from January 2023 - November 2023 and during this time was off Stelara.  He was given azathioprine 50 mg and intermittent steroids.       Today he reports intermittent abdominal cramping with daily bloating.  He is having 3-4 loose stools without any rectal bleeding or melena.  No nocturnal.  He has some urgency but no tenesmus.  His weight is stable.  Appetite is good. No joint pain, rashes, or eye problems.      He is smoking 5-6 cigarettes a day.  He drinks alcohol on weekends.  He denies any present or past history of marijuana or recreational drug use. He denies any family history of colon polyps, colon cancer, ulcerative colitis, Crohn's disease or other GI illnesses.  Works full time at [employer].         IBD History:   IBD disease: Crohn's disease   Disease location: Ileum, colon   Disease behavior: non penetrating, non stricturing        Current IBD Therapy:  none     Therapeutic Drug Monitoring Labs:  February 10, 2022: Ustek level: 3.9 mcg/mL, Ab< 40     Prior IBD Therapies:  Remicade infusions x 4 (May 2017-Sept 2017)  Stelara 90mg every 8 weeks (December 2021 - January 2023)        Pertinent Endoscopy/Imaging:  March 13, 2017 Colonoscopy (Dr. Whitaker): normal SHAYY, normal rectum. Erosions, erythema in the TI, IC valve. Erosions, ulcers and edema in  cobblestone appearance from sigmoid to ascending colon.    March 13, 2017 EGD (Dr. Whitaker): patchy areas of erythema and raised swelling in the antrum     November 24, 2021: Colonoscopy: scattered shallow and deep serpentine ulcerations in the TI.  From the rectosigmoid to the cecum there was inflammation with confluent, deep and serpentine ulcerations.  Pathology: colon: moderate active chronic colitis, rectum: mild chronic inactive colitis     November 2021: Fecal calprotectin: 8128     July 15, 2022: Colonoscopy: Normal TI, no inflammation in the colon.  Scattered mucosal changes with altered vascularity, pseudopolyps, scarring from sigmoid to cecum.  One aphthous ulcer in the rectum o/w normal rectum.  Path: TI normal, inactive chronic colitis in the colon     Prior Pertinent Surgeries:   None     Complications:   None     Extraintestinal Manifestations:  None     ROS:  Constitutional: No fevers, chills, No weight loss  CV: No chest pain  Pulm: No cough, No shortness of breath  GI: see HPI    Medical History:   Past Medical History:   Diagnosis Date    Crohn's disease of both small and large intestine     Heart murmur     Irritable bowel syndrome (IBS)          Surgical History:   Past Surgical History:   Procedure Laterality Date    COLONOSCOPY      COLONOSCOPY W/ BIOPSIES      ESOPHAGOGASTRODUODENOSCOPY      ESOPHAGOGASTRODUODENOSCOPY N/A     transfusion of blood or blood components         Family History:   Family History   Problem Relation Name Age of Onset    Hypertension Father      Seizures Sister      Cancer Maternal Grandmother      Cancer Maternal Aunt     .    Social History:   Social History     Socioeconomic History    Marital status: Single   Tobacco Use    Smoking status: Every Day     Current packs/day: 0.25     Average packs/day: 0.3 packs/day for 13.0 years (3.3 ttl pk-yrs)     Types: Cigars, Cigarettes    Smokeless tobacco: Never   Substance and Sexual Activity    Alcohol use: Yes      "Alcohol/week: 6.0 standard drinks of alcohol     Types: 6 Cans of beer per week     Comment: weekends    Drug use: No       Review of patient's allergies indicates:  No Known Allergies    Medications:   Medications Prior to Admission   Medication Sig Dispense Refill Last Dose    polyethylene glycol (MOVIPREP) 100-7.5-2.691 gram solution Take 2,000 mLs by mouth As instructed (take as directed per instructions provided by GI clinic). 1 kit 0 5/15/2024    ustekinumab (STELARA) 90 mg/mL Syrg syringe Inject 1 mL into the skin every 8 weeks.            Physical Exam:    Vital Signs:   Vitals:    05/15/24 1246   BP: 121/77   Pulse: 79   Resp: 20   Temp: 98.3 °F (36.8 °C)     /77 (BP Location: Left arm, Patient Position: Lying)   Pulse 79   Temp 98.3 °F (36.8 °C) (Oral)   Resp 20   Ht 5' 11" (1.803 m)   Wt 73.5 kg (162 lb)   SpO2 98%   BMI 22.59 kg/m²     General:          Well appearing in no acute distress  Lungs: Clear to auscultation bilaterally, respirations unlabored  Heart: Regular rate and rhythm, S1 and S2 normal, no obvious murmurs  Abdomen:         Soft, non-tender, bowel sounds normal, no masses, no organomegaly        Labs:  Lab Results   Component Value Date    WBC 5.99 04/23/2024    HGB 14.8 04/23/2024    HCT 44.3 04/23/2024    MCV 77.7 (L) 04/23/2024     04/23/2024     No results found for: "INR", "PT", "APTT"  Lab Results   Component Value Date     04/23/2024    K 3.8 04/23/2024    CO2 25 04/23/2024    BUN 8.0 (L) 04/23/2024    CREATININE 1.21 (H) 04/23/2024    LABPROT 8.4 (H) 04/23/2024    ALBUMIN 4.1 04/23/2024    BILITOT 0.4 04/23/2024    ALKPHOS 99 04/23/2024    ALT 10 04/23/2024    AST 19 04/23/2024         Assessment and Plan:    History reviewed, vital signs satisfactory, cardiopulmonary status satisfactory.  I have explained the sedation options, risks, benefits, and alternatives of this endoscopic procedure to the patient including but not limited to bleeding, " inflammation, infection, perforation, and death.  All questions were answered and the patient consented to proceed with procedure as planned.   The patient is deemed an appropriate candidate for the sedation as planned.      Brooke Correa MD, MPH   of Clinical Medicine  Gastroenterology and Hepatology  [facility]    5/15/2024  12:59 PM

## 2024-05-16 VITALS
WEIGHT: 162 LBS | HEART RATE: 73 BPM | DIASTOLIC BLOOD PRESSURE: 75 MMHG | BODY MASS INDEX: 22.68 KG/M2 | TEMPERATURE: 98 F | OXYGEN SATURATION: 99 % | HEIGHT: 71 IN | SYSTOLIC BLOOD PRESSURE: 107 MMHG | RESPIRATION RATE: 18 BRPM

## 2024-05-17 DIAGNOSIS — K50.80 CROHN'S DISEASE OF BOTH SMALL AND LARGE INTESTINE WITHOUT COMPLICATION: Primary | ICD-10-CM

## 2024-05-17 LAB
DHEA SERPL-MCNC: NORMAL
ESTROGEN SERPL-MCNC: NORMAL PG/ML
INSULIN SERPL-ACNC: NORMAL U[IU]/ML
LAB AP CLINICAL INFORMATION: NORMAL
LAB AP GROSS DESCRIPTION: NORMAL
LAB AP REPORT FOOTNOTES: NORMAL
T3RU NFR SERPL: NORMAL %

## 2024-05-17 RX ORDER — METHYLPREDNISOLONE SOD SUCC 125 MG
40 VIAL (EA) INJECTION
OUTPATIENT
Start: 2024-05-17

## 2024-05-17 RX ORDER — DIPHENHYDRAMINE HYDROCHLORIDE 50 MG/ML
25 INJECTION INTRAMUSCULAR; INTRAVENOUS
OUTPATIENT
Start: 2024-05-17

## 2024-05-17 RX ORDER — USTEKINUMAB 90 MG/ML
90 INJECTION, SOLUTION SUBCUTANEOUS
Qty: 1 ML | Refills: 6 | Status: SHIPPED | OUTPATIENT
Start: 2024-05-17

## 2024-05-17 RX ORDER — HEPARIN 100 UNIT/ML
500 SYRINGE INTRAVENOUS
OUTPATIENT
Start: 2024-05-17

## 2024-05-17 RX ORDER — SODIUM CHLORIDE 0.9 % (FLUSH) 0.9 %
10 SYRINGE (ML) INJECTION
OUTPATIENT
Start: 2024-05-17

## 2024-05-17 RX ORDER — IPRATROPIUM BROMIDE AND ALBUTEROL SULFATE 2.5; .5 MG/3ML; MG/3ML
3 SOLUTION RESPIRATORY (INHALATION)
OUTPATIENT
Start: 2024-05-17

## 2024-05-17 RX ORDER — EPINEPHRINE 1 MG/ML
0.3 INJECTION, SOLUTION, CONCENTRATE INTRAVENOUS
OUTPATIENT
Start: 2024-05-17

## 2024-05-17 RX ORDER — ACETAMINOPHEN 325 MG/1
650 TABLET ORAL
OUTPATIENT
Start: 2024-05-17

## 2024-05-17 NOTE — TELEPHONE ENCOUNTER
----- Message from Brooke Correa MD sent at 5/15/2024  4:45 PM CDT -----  Please restart stelara.  Loading dose and maintenance injections.  Needs to turn in fecal calprotectin

## 2024-05-22 NOTE — ASSESSMENT & PLAN NOTE
Diagnosed March 2017.  Remicade infusions x 4 in 2017  Off therapy x years  November 2021: Colonoscopy: Active disease in TI and colon  Fecal calprotectin 8128  HS CRP 13.46   Started Stelara in December 2021  Uste trough 3.9, Ab < 40 on 90 mg every 8 weeks  July 2022 Colonoscopy: no endoscopic inflammation  CRP: 3.8 (normal)  Off therapy Jan 2023 - Nov 2023  Given azathioprine and steroids with persistent symptoms during this time.     Schedule colonoscopy  Labs and fecal calprotectin today  Tentative plan to restart Stelara, loading doses followed by maintenance dosing given prior treatment response pending colonoscopy findings.

## 2024-08-02 ENCOUNTER — INFUSION (OUTPATIENT)
Dept: INFUSION THERAPY | Facility: HOSPITAL | Age: 34
End: 2024-08-02
Attending: INTERNAL MEDICINE
Payer: MEDICAID

## 2024-08-02 VITALS
RESPIRATION RATE: 18 BRPM | TEMPERATURE: 98 F | HEART RATE: 81 BPM | WEIGHT: 172.38 LBS | OXYGEN SATURATION: 100 % | SYSTOLIC BLOOD PRESSURE: 138 MMHG | DIASTOLIC BLOOD PRESSURE: 74 MMHG | BODY MASS INDEX: 24.04 KG/M2

## 2024-08-02 DIAGNOSIS — K50.80 CROHN'S DISEASE OF BOTH SMALL AND LARGE INTESTINE WITHOUT COMPLICATION: Primary | ICD-10-CM

## 2024-08-02 PROCEDURE — 25000003 PHARM REV CODE 250: Performed by: INTERNAL MEDICINE

## 2024-08-02 PROCEDURE — 96365 THER/PROPH/DIAG IV INF INIT: CPT

## 2024-08-02 RX ORDER — SODIUM CHLORIDE 0.9 % (FLUSH) 0.9 %
10 SYRINGE (ML) INJECTION
OUTPATIENT
Start: 2024-08-02

## 2024-08-02 RX ORDER — EPINEPHRINE 1 MG/ML
0.3 INJECTION, SOLUTION, CONCENTRATE INTRAVENOUS
OUTPATIENT
Start: 2024-08-02

## 2024-08-02 RX ORDER — HEPARIN 100 UNIT/ML
500 SYRINGE INTRAVENOUS
OUTPATIENT
Start: 2024-08-02

## 2024-08-02 RX ORDER — ACETAMINOPHEN 325 MG/1
650 TABLET ORAL
OUTPATIENT
Start: 2024-08-02

## 2024-08-02 RX ORDER — IPRATROPIUM BROMIDE AND ALBUTEROL SULFATE 2.5; .5 MG/3ML; MG/3ML
3 SOLUTION RESPIRATORY (INHALATION)
OUTPATIENT
Start: 2024-08-02

## 2024-08-02 RX ORDER — ACETAMINOPHEN 325 MG/1
650 TABLET ORAL
Status: ACTIVE | OUTPATIENT
Start: 2024-08-02 | End: 2024-08-02

## 2024-08-02 RX ORDER — IPRATROPIUM BROMIDE AND ALBUTEROL SULFATE 2.5; .5 MG/3ML; MG/3ML
3 SOLUTION RESPIRATORY (INHALATION)
Status: ACTIVE | OUTPATIENT
Start: 2024-08-02 | End: 2024-08-02

## 2024-08-02 RX ORDER — DIPHENHYDRAMINE HYDROCHLORIDE 50 MG/ML
25 INJECTION INTRAMUSCULAR; INTRAVENOUS
OUTPATIENT
Start: 2024-08-02

## 2024-08-02 RX ORDER — METHYLPREDNISOLONE SOD SUCC 125 MG
40 VIAL (EA) INJECTION
OUTPATIENT
Start: 2024-08-02

## 2024-08-02 RX ORDER — EPINEPHRINE 1 MG/ML
0.3 INJECTION, SOLUTION, CONCENTRATE INTRAVENOUS
Status: ACTIVE | OUTPATIENT
Start: 2024-08-02 | End: 2024-08-02

## 2024-08-02 RX ORDER — DIPHENHYDRAMINE HYDROCHLORIDE 50 MG/ML
25 INJECTION INTRAMUSCULAR; INTRAVENOUS
Status: ACTIVE | OUTPATIENT
Start: 2024-08-02 | End: 2024-08-02

## 2024-08-02 RX ADMIN — SODIUM CHLORIDE 390 MG: 9 INJECTION, SOLUTION INTRAVENOUS at 12:08

## 2024-08-02 NOTE — LETTER
August 2, 2024      [facility]  [address] [facility] [address]  Phone: [phone]  Fax: [phone]       Patient: Tee Corrales   YOB: 1990  Date of Visit: 08/02/2024    To Whom It May Concern:    Mauro Corrales  was at [facility] on 08/02/2024. The patient may return to work/school on 08/03/2024 with no restrictions. If you have any questions or concerns, or if I can be of further assistance, please do not hesitate to contact me.    Sincerely,    Connie Winter RN

## 2024-08-02 NOTE — NURSING
Patient arrived ambulatory to infusion alert and oriented with no acute complaints at this time.  Pt is here for #1 reload mor Huber tolerated tx well.    Connie Winter RN

## 2024-08-15 ENCOUNTER — TELEPHONE (OUTPATIENT)
Dept: GASTROENTEROLOGY | Facility: CLINIC | Age: 34
End: 2024-08-15
Payer: MEDICAID

## 2024-08-15 NOTE — TELEPHONE ENCOUNTER
----- Message from Maia England sent at 7/26/2024 10:44 AM CDT -----  Regarding: stelara  Rx Stelara 90 mg, complete PA

## 2024-10-22 ENCOUNTER — OFFICE VISIT (OUTPATIENT)
Dept: GASTROENTEROLOGY | Facility: CLINIC | Age: 34
End: 2024-10-22
Payer: MEDICAID

## 2024-10-22 VITALS
HEART RATE: 92 BPM | OXYGEN SATURATION: 98 % | HEIGHT: 71 IN | TEMPERATURE: 98 F | WEIGHT: 171 LBS | BODY MASS INDEX: 23.94 KG/M2 | RESPIRATION RATE: 16 BRPM | SYSTOLIC BLOOD PRESSURE: 118 MMHG | DIASTOLIC BLOOD PRESSURE: 81 MMHG

## 2024-10-22 DIAGNOSIS — K50.80 CROHN'S DISEASE OF BOTH SMALL AND LARGE INTESTINE WITHOUT COMPLICATION: Primary | ICD-10-CM

## 2024-10-22 PROCEDURE — 99213 OFFICE O/P EST LOW 20 MIN: CPT | Mod: PBBFAC | Performed by: INTERNAL MEDICINE

## 2024-10-22 NOTE — PROGRESS NOTES
Inflammatory Bowel Disease        Established Patient Note         TODAY'S VISIT DATE:  10/22/2024  The patient's last visit with me was on 04/23/2024    PCP: Eliza, Primary Doctor      Referring MD:   No ref. provider found    History of Present Illness:  Mr. Corrales is a 33 year old AAM with Crohn's ileocolitis on Stelara 90mg every 8 weeks here for follow-up.    He was diagnosed in March 2017 after experiencing several months of diarrhea, 100 lb weight loss, rectal bleeding, anemia, and abdominal pain.    March 13, 2017 Colonoscopy (Dr. Whitaker): normal SHAYY, normal rectum. Erosions, erythema in the TI, IC valve. Erosions, ulcers and edema in cobblestone appearance from sigmoid to ascending colon.  March 13, 2017 EGD (Dr. Whitaker): patchy areas of erythema and raised swelling in the antrum    C diff PCR positive Jan-March 2017 -- treated per notes    He was started on Remicade in May 2017. He did not receive standard loading doses. He received 4 q8 weeks doses until September 2017 and then did not receive any further infusions (he states they were stopped, documented that he no showed). Regardless he felt better with infusions and went until July/August 2021 without any significant symptoms except occasional diarrhea with dietary indiscretion. His weight improved following infusions and no abd pain.     He was incarcerated in 2018 and then for 90 days in 2021, released around September 2021.    In August 2021 he began with diarrhea, intermittent lower abdominal pain, night sweats, and weight loss. He lost approximately 60 pounds since symptoms started. He was seen by GI in [place] during his incarceration but no work-up was performed prior to his release.    November 2021 Colonoscopy:  scattered shallow and deep serpentine ulcerations in the TI.  From the rectosigmoid to the cecum there was inflammation with confluent, deep and serpentine ulcerations.  Fecal calprotectin 8128    Started  Stelara in December 2021.      Clinical improvement of symptoms on medication.    February 10, 2022: Ustek level: 3.9 mcg/mL    July 2022: Colonoscopy: endoscopic resolution of TI and colonic inflammation.  Scarring, pseudopolyps, altered vascularity in the colon.  Path: inactive chronic colitis    Seen in November 2022 and was doing well.  Incarcerated from January 2023 - November 2023 and during this time was off Stelara.  He was given azathioprine 50 mg and intermittent steroids.    Last seen in April 2024 he was having intermittent abdominal cramping with bloating, 3-4 loose stools.      May 2024 Colonoscopy: erosions, 1-2 aphthous ulcerations in the TI. SES-CD 11.  Edema, erosions, patchy granularity, pseudopolyps (descending/sigmoid) and scarring (right colon) in the entire colon.   I recommended restarting stelara.     He received induction infusion for stelara in August but reports he was told the injection will cost $4000 by pharmacy. PA approved per chart review.   He is having 3 bowel movements a day varying in consistency from loose to formed. Denies any hematochezia, urgency, nocturnal diarrhea, tenesmus. Appetite is good and weight is stable.     Continues to smoke about 1/2 per day. States he is attempting to cut down. He drinks alcohol on weekends.  He denies any present or past history of marijuana or recreational drug use. He denies any family history of colon polyps, colon cancer, ulcerative colitis, Crohn's disease or other GI illnesses.  Works full time at [employer].         IBD History:   IBD disease: Crohn's disease   Disease location: Ileum, colon   Disease behavior: non penetrating, non stricturing        Current IBD Therapy:       Therapeutic Drug Monitoring Labs:       Prior IBD Therapies:  Remicade infusions x 4 (May 2017-Sept 2017)  Prednisone   Azathioprine  Stelara 90mg every 8 weeks (December 2021 - January 2023, 1 infusion August 2024) (February 10, 2022: Ustek level: 3.9 mcg/mL, Ab<  40)        Pertinent Endoscopy/Imaging:  March 13, 2017 Colonoscopy (Dr. Whitaker): normal SHAYY, normal rectum. Erosions, erythema in the TI, IC valve. Erosions, ulcers and edema in cobblestone appearance from sigmoid to ascending colon.    March 13, 2017 EGD (Dr. Whitaker): patchy areas of erythema and raised swelling in the antrum     November 24, 2021: Colonoscopy: scattered shallow and deep serpentine ulcerations in the TI.  From the rectosigmoid to the cecum there was inflammation with confluent, deep and serpentine ulcerations.  Pathology: colon: moderate active chronic colitis, rectum: mild chronic inactive colitis    November 2021: Fecal calprotectin: 8128    July 15, 2022: Colonoscopy: Normal TI, no inflammation in the colon.  Scattered mucosal changes with altered vascularity, pseudopolyps, scarring from sigmoid to cecum.  One aphthous ulcer in the rectum o/w normal rectum.  Path: TI normal, inactive chronic colitis in the colon    May 2024 Colonoscopy: erosions, 1-2 aphthous ulcerations in the TI. SES-CD 11.  Edema, erosions, patchy granularity, pseudopolyps (descending/sigmoid) and scarring (right colon) in the entire colon.      Prior Pertinent Surgeries:   None     Complications:   None     Extraintestinal Manifestations:  None    Review of Systems   Constitutional:  Negative for appetite change and unexpected weight change.   HENT:  Negative for trouble swallowing.    Respiratory: Negative.     Cardiovascular: Negative.    Gastrointestinal:  Negative for abdominal pain, blood in stool, change in bowel habit, constipation and diarrhea.   Musculoskeletal: Negative.    Neurological: Negative.    Hematological: Negative.    Psychiatric/Behavioral: Negative.     All other systems reviewed and are negative.         Medical/Surgical History:   Past Medical History:   Diagnosis Date    Crohn's disease of both small and large intestine     Heart murmur     Irritable bowel syndrome (IBS)      Past Surgical  "History:   Procedure Laterality Date    COLONOSCOPY      COLONOSCOPY W/ BIOPSIES      COLONOSCOPY, WITH 1 OR MORE BIOPSIES N/A 5/15/2024    Procedure: COLONOSCOPY, WITH 1 OR MORE BIOPSIES;  Surgeon: Brooke Correa MD;  [facility] END[facility]COPY;  Service: Gastroenterology;  Laterality: N/A;    ESOPHAGOGASTRODUODENOSCOPY      ESOPHAGOGASTRODUODENOSCOPY N/A     transfusion of blood or blood components           Family History:   Family History   Problem Relation Name Age of Onset    Hypertension Father      Seizures Sister      Cancer Maternal Grandmother      Cancer Maternal Aunt          Social History:   Social History     Socioeconomic History    Marital status: Single   Tobacco Use    Smoking status: Every Day     Current packs/day: 0.25     Average packs/day: 0.3 packs/day for 13.0 years (3.3 ttl pk-yrs)     Types: Cigars, Cigarettes    Smokeless tobacco: Never   Substance and Sexual Activity    Alcohol use: Yes     Alcohol/week: 6.0 standard drinks of alcohol     Types: 6 Cans of beer per week     Comment: weekends    Drug use: No        Review of patient's allergies indicates:  No Known Allergies    Current Medications:   Outpatient Medications Marked as Taking for the 10/22/24 encounter (Office Visit) with RESIDENT, [facility]   Medication Sig Dispense Refill    dicyclomine (BENTYL) 10 MG capsule Take 1 capsule (10 mg total) by mouth every 6 (six) hours as needed (pain, cramping). 45 capsule 3    ergocalciferol (ERGOCALCIFEROL) 50,000 unit Cap Take 1 capsule (50,000 Units total) by mouth every 7 days. for 24 doses 12 capsule 1    ferrous sulfate 324 mg (65 mg iron) TbEC Take 1 tablet (324 mg total) by mouth once daily. 30 tablet 11        Vital Signs:  /81 (BP Location: Right arm, Patient Position: Sitting)   Pulse 92   Temp 98 °F (36.7 °C) (Oral)   Resp 16   Ht 5' 11" (1.803 m)   Wt 77.6 kg (171 lb)   SpO2 98%   BMI 23.85 kg/m²      Physical Exam  Vitals reviewed. "   Constitutional:       Appearance: Normal appearance.   HENT:      Head: Normocephalic and atraumatic.      Mouth/Throat:      Mouth: Mucous membranes are moist.      Comments: Poor dentition, carries noted R posterior aspect   Eyes:      Extraocular Movements: Extraocular movements intact.      Conjunctiva/sclera: Conjunctivae normal.   Cardiovascular:      Rate and Rhythm: Normal rate and regular rhythm.   Pulmonary:      Effort: Pulmonary effort is normal.      Breath sounds: Normal breath sounds.   Abdominal:      General: Bowel sounds are normal.      Palpations: Abdomen is soft.      Tenderness: There is no abdominal tenderness.   Musculoskeletal:      Cervical back: Normal range of motion.   Skin:     General: Skin is warm and dry.   Neurological:      General: No focal deficit present.      Mental Status: He is alert and oriented to person, place, and time.   Psychiatric:         Mood and Affect: Mood normal.         Behavior: Behavior normal.         Labs: Reviewed  Hgb   Date Value Ref Range Status   04/23/2024 14.8 14.0 - 18.0 g/dL Final     Albumin   Date Value Ref Range Status   04/23/2024 4.1 3.5 - 5.0 g/dL Final     Iron Level   Date Value Ref Range Status   04/23/2024 107 65 - 175 ug/dL Final     Ferritin Level   Date Value Ref Range Status   04/23/2024 33.30 21.81 - 274.66 ng/mL Final     Folate Level   Date Value Ref Range Status   04/23/2024 7.4 7.0 - 31.4 ng/mL Final     Vitamin B12   Date Value Ref Range Status   04/23/2024 514 213 - 816 pg/mL Final     Vitamin D   Date Value Ref Range Status   04/23/2024 12.7 (L) 30.0 - 80.0 ng/mL Final     CRP   Date Value Ref Range Status   04/23/2024 13.70 (H) <5.00 mg/L Final     Quantiferon gold: negative April 2024  HBsAg: negative April 2024  HBcIgG: negative April 2024  Fecal calprotectin: 8128 (Nov 2021)        Assessment/Plan:    Problem List Items Addressed This Visit       Crohn's disease of both small and large intestine without complication -  Primary    Diagnosed March 2017.  Remicade infusions x 4 in 2017  Off therapy x years  November 2021: Colonoscopy: Active disease in TI and colon  Fecal calprotectin 8128  HS CRP 13.46   Started Stelara in December 2021  Uste trough 3.9, Ab < 40 on 90 mg every 8 weeks  July 2022 Colonoscopy: no endoscopic inflammation  CRP: 3.8 (normal)  Off therapy Jan 2023 - Nov 2023  Given azathioprine and steroids with persistent symptoms during this time.     May 2024 Colonoscopy: erosions, 1-2 aphthous ulcerations in the TI. SES-CD 11.  Edema, erosions, patchy granularity, pseudopolyps (descending/sigmoid) and scarring (right colon) in the entire colon.     Stelara induction infusion in August 2024  Injections approved but never picked up the medication  Fecal calprotectin  Start Stelera injections as previously approved, encourage compliance with medication and follow-up with getting medication from pharmacy           Relevant Orders    Calprotectin, Stool           HEALTH MAINTENANCE:     Vaccinations:    Influenza (inactive):  oct 2022  PCV 13 (Prevnar): May 10, 2022  PPSV 23 (Pneumovax): November 2022  PCV 20 (prevnar): Due 2027  Tetanus (TdaP): March 2017, recommended every 10 years  HPV (males and females ages 11-46 yo): N/A  Meningococcal: No risk factors  Hepatitis B: not immune  Hepatitis A:  not immune   MMR (live vaccine): UTD          Chickenpox status/Varicella (live vaccine): had chickenpox as a child  Shingrix: recommended   COVID-19: received per patient x 2      Colorectal cancer risk:  Risk factors: > 8 years of disease, > 1/3 colon involved  - Distribution of colonic disease: > 1/3 of colon  - Year of symptom onset: 2017  - Colonoscopy for surveillance starting 2025    Ophthalmologic exam recommended yearly  Dermatologic exam recommended yearly due to risk of skin cancer with IBD/meds    Bone health:  Calcium 3382-4156 mg daily and vitamin D 800 IU daily  Risk factors for osteopenia/osteoporosis:  Vitamin D:  12.7  Ergocalciferol 50,000 IU weekly x 12 weeks completed  DEXA scan: will need baseline      Smoking status: current smoker      Follow up: in 3 months      Lauro Marie DO  [facility] PGY-III

## 2024-10-22 NOTE — ASSESSMENT & PLAN NOTE
Diagnosed March 2017.  Remicade infusions x 4 in 2017  Off therapy x years  November 2021: Colonoscopy: Active disease in TI and colon  Fecal calprotectin 8128  HS CRP 13.46   Started Stelara in December 2021  Uste trough 3.9, Ab < 40 on 90 mg every 8 weeks  July 2022 Colonoscopy: no endoscopic inflammation  CRP: 3.8 (normal)  Off therapy Jan 2023 - Nov 2023  Given azathioprine and steroids with persistent symptoms during this time.     May 2024 Colonoscopy: erosions, 1-2 aphthous ulcerations in the TI. SES-CD 11.  Edema, erosions, patchy granularity, pseudopolyps (descending/sigmoid) and scarring (right colon) in the entire colon.     Stelara induction infusion in August 2024  Injections approved but never picked up the medication  Fecal calprotectin  Start Stelera injections as previously approved, encourage compliance with medication and follow-up with getting medication from pharmacy

## 2024-10-22 NOTE — LETTER
October 22, 2024      [facility]  [address] [facility] [address]  Phone: [phone]       Patient: Tee Corrales   YOB: 1990  Date of Visit: 10/22/2024    To Whom It May Concern:    Mauro Corrales  was at [facility] on 10/22/2024. The patient may return to work/school on 10/23/24 with no restrictions. If you have any questions or concerns, or if I can be of further assistance, please do not hesitate to contact me.    Sincerely,    Maia England

## 2024-10-22 NOTE — PROGRESS NOTES
Inflammatory Bowel Disease        Established Patient Note         TODAY'S VISIT DATE:  10/22/2024  The patient's last visit with me was on 5/10/2022.     PCP: No, Primary Doctor      Referring MD:   No ref. provider found    History of Present Illness:  Mr. Corrales is a 33 year old AAM with Crohn's ileocolitis on Stelara 90mg every 8 weeks here for follow-up.    He was diagnosed in March 2017 after experiencing several months of diarrhea, 100 lb weight loss, rectal bleeding, anemia, and abdominal pain.    March 13, 2017 Colonoscopy (Dr. Whitaker): normal SHAYY, normal rectum. Erosions, erythema in the TI, IC valve. Erosions, ulcers and edema in cobblestone appearance from sigmoid to ascending colon.  March 13, 2017 EGD (Dr. Whitaker): patchy areas of erythema and raised swelling in the antrum    C diff PCR positive Jan-March 2017 -- treated per notes    He was started on Remicade in May 2017. He did not receive standard loading doses. He received 4 q8 weeks doses until September 2017 and then did not receive any further infusions (he states they were stopped, documented that he no showed). Regardless he felt better with infusions and went until July/August 2021 without any significant symptoms except occasional diarrhea with dietary indiscretion. His weight improved following infusions and no abd pain.     He was incarcerated in 2018 and then for 90 days in 2021, released around September 2021.    In August 2021 he began with diarrhea, intermittent lower abdominal pain, night sweats, and weight loss. He lost approximately 60 pounds since symptoms started. He was seen by GI in [place] during his incarceration but no work-up was performed prior to his release.    November 2021 Colonoscopy:  scattered shallow and deep serpentine ulcerations in the TI.  From the rectosigmoid to the cecum there was inflammation with confluent, deep and serpentine ulcerations.  Fecal calprotectin  8128    Started Stelara in December 2021.      Clinical improvement of symptoms on medication.    February 10, 2022: Ustek level: 3.9 mcg/mL    July 2022: Colonoscopy: endoscopic resolution of TI and colonic inflammation.  Scarring, pseudopolyps, altered vascularity in the colon.  Path: inactive chronic colitis    Seen in November 2022 and was doing well.  Incarcerated from January 2023 - November 2023 and during this time was off Stelara.  He was given azathioprine 50 mg and intermittent steroids.    Last seen in April 2024 he was having intermittent abdominal cramping with bloating, 3-4 loose stools.      May 2024 Colonoscopy: erosions, 1-2 aphthous ulcerations in the TI. SES-CD 11.  Edema, erosions, patchy granularity, pseudopolyps (descending/sigmoid) and scarring (right colon) in the entire colon.   I recommended restarting stelara.     Today...      He is smoking 5-6 cigarettes a day.  He drinks alcohol on weekends.  He denies any present or past history of marijuana or recreational drug use. He denies any family history of colon polyps, colon cancer, ulcerative colitis, Crohn's disease or other GI illnesses.  Works full time at [employer].         IBD History:   IBD disease: Crohn's disease   Disease location: Ileum, colon   Disease behavior: non penetrating, non stricturing        Current IBD Therapy:  Stelara 90mg every 8 weeks (December 2021 - present)     Therapeutic Drug Monitoring Labs:  February 10, 2022: Ustek level: 3.9 mcg/mL, Ab< 40     Prior IBD Therapies:  Remicade infusions x 4 (May 2017-Sept 2017)  Prednisone   Azathioprine        Pertinent Endoscopy/Imaging:  March 13, 2017 Colonoscopy (Dr. Whitaker): normal SHAYY, normal rectum. Erosions, erythema in the TI, IC valve. Erosions, ulcers and edema in cobblestone appearance from sigmoid to ascending colon.    March 13, 2017 EGD (Dr. Whitaker): patchy areas of erythema and raised swelling in the antrum     November 24, 2021: Colonoscopy: scattered  shallow and deep serpentine ulcerations in the TI.  From the rectosigmoid to the cecum there was inflammation with confluent, deep and serpentine ulcerations.  Pathology: colon: moderate active chronic colitis, rectum: mild chronic inactive colitis    November 2021: Fecal calprotectin: 8128    July 15, 2022: Colonoscopy: Normal TI, no inflammation in the colon.  Scattered mucosal changes with altered vascularity, pseudopolyps, scarring from sigmoid to cecum.  One aphthous ulcer in the rectum o/w normal rectum.  Path: TI normal, inactive chronic colitis in the colon    May 2024 Colonoscopy: erosions, 1-2 aphthous ulcerations in the TI. SES-CD 11.  Edema, erosions, patchy granularity, pseudopolyps (descending/sigmoid) and scarring (right colon) in the entire colon.      Prior Pertinent Surgeries:   None     Complications:   None     Extraintestinal Manifestations:  None    Review of Systems   Constitutional:  Negative for appetite change and unexpected weight change.   HENT:  Negative for trouble swallowing.    Respiratory: Negative.     Cardiovascular: Negative.    Gastrointestinal:  Negative for abdominal pain, blood in stool, change in bowel habit, constipation and diarrhea.   Musculoskeletal: Negative.    Neurological: Negative.    Hematological: Negative.    Psychiatric/Behavioral: Negative.     All other systems reviewed and are negative.         Medical/Surgical History:   Past Medical History:   Diagnosis Date    Crohn's disease of both small and large intestine     Heart murmur     Irritable bowel syndrome (IBS)      Past Surgical History:   Procedure Laterality Date    COLONOSCOPY      COLONOSCOPY W/ BIOPSIES      COLONOSCOPY, WITH 1 OR MORE BIOPSIES N/A 5/15/2024    Procedure: COLONOSCOPY, WITH 1 OR MORE BIOPSIES;  Surgeon: Brooke Correa MD;  Location: [facility] ENDOSCOPY;  Service: Gastroenterology;  Laterality: N/A;    ESOPHAGOGASTRODUODENOSCOPY      ESOPHAGOGASTRODUODENOSCOPY N/A     transfusion of  blood or blood components           Family History:   Family History   Problem Relation Name Age of Onset    Hypertension Father      Seizures Sister      Cancer Maternal Grandmother      Cancer Maternal Aunt          Social History:   Social History     Socioeconomic History    Marital status: Single   Tobacco Use    Smoking status: Every Day     Current packs/day: 0.25     Average packs/day: 0.3 packs/day for 13.0 years (3.3 ttl pk-yrs)     Types: Cigars, Cigarettes    Smokeless tobacco: Never   Substance and Sexual Activity    Alcohol use: Yes     Alcohol/week: 6.0 standard drinks of alcohol     Types: 6 Cans of beer per week     Comment: weekends    Drug use: No        Review of patient's allergies indicates:  No Known Allergies    Current Medications:   No outpatient medications have been marked as taking for the 10/22/24 encounter (Appointment) with [facility], [facility].        Vital Signs:  There were no vitals taken for this visit.     Physical Exam  Vitals reviewed.   Constitutional:       Appearance: Normal appearance.   HENT:      Head: Normocephalic and atraumatic.      Mouth/Throat:      Mouth: Mucous membranes are moist.   Eyes:      Extraocular Movements: Extraocular movements intact.      Conjunctiva/sclera: Conjunctivae normal.   Cardiovascular:      Rate and Rhythm: Normal rate and regular rhythm.   Pulmonary:      Effort: Pulmonary effort is normal.      Breath sounds: Normal breath sounds.   Abdominal:      General: Bowel sounds are normal.      Palpations: Abdomen is soft.      Tenderness: There is no abdominal tenderness.   Musculoskeletal:      Cervical back: Normal range of motion.   Skin:     General: Skin is warm and dry.   Neurological:      General: No focal deficit present.      Mental Status: He is alert and oriented to person, place, and time.   Psychiatric:         Mood and Affect: Mood normal.         Behavior: Behavior normal.         Labs: Reviewed  Hgb   Date Value Ref  Range Status   04/23/2024 14.8 14.0 - 18.0 g/dL Final     Albumin   Date Value Ref Range Status   04/23/2024 4.1 3.5 - 5.0 g/dL Final     Iron Level   Date Value Ref Range Status   04/23/2024 107 65 - 175 ug/dL Final     Ferritin Level   Date Value Ref Range Status   04/23/2024 33.30 21.81 - 274.66 ng/mL Final     Folate Level   Date Value Ref Range Status   04/23/2024 7.4 7.0 - 31.4 ng/mL Final     Vitamin B12   Date Value Ref Range Status   04/23/2024 514 213 - 816 pg/mL Final     Vitamin D   Date Value Ref Range Status   04/23/2024 12.7 (L) 30.0 - 80.0 ng/mL Final     CRP   Date Value Ref Range Status   04/23/2024 13.70 (H) <5.00 mg/L Final     Quantiferon gold: negative April 2024  HBsAg: negative April 2024  HBcIgG: negative April 2024  Fecal calprotectin: 8128 (Nov 2021)        Assessment/Plan:    Problem List Items Addressed This Visit       Crohn's disease of both small and large intestine without complication - Primary     Diagnosed March 2017.  Remicade infusions x 4 in 2017  Off therapy x years  November 2021: Colonoscopy: Active disease in TI and colon  Fecal calprotectin 8128  HS CRP 13.46   Started Stelara in December 2021  Uste trough 3.9, Ab < 40 on 90 mg every 8 weeks  July 2022 Colonoscopy: no endoscopic inflammation  CRP: 3.8 (normal)  Off therapy Jan 2023 - Nov 2023  Given azathioprine and steroids with persistent symptoms during this time.     May 2024 Colonoscopy: erosions, 1-2 aphthous ulcerations in the TI. SES-CD 11.  Edema, erosions, patchy granularity, pseudopolyps (descending/sigmoid) and scarring (right colon) in the entire colon.   [facility] ordered and approved but never picked up the medication                         HEALTH MAINTENANCE:     Vaccinations:    Influenza (inactive):  oct 2022  PCV 13 (Prevnar): May 10, 2022  PPSV 23 (Pneumovax): November 2022, repeat 5 years later and then after age 66 yo  PCV 20 (prevnar): Due 2027  Tetanus (TdaP): March 2017, recommended every 10  years  HPV (males and females ages 11-46 yo): N/A  Meningococcal: No risk factors  Hepatitis B: not immune  Hepatitis A:  not immune   MMR (live vaccine): UTD          Chickenpox status/Varicella (live vaccine): had chickenpox as a child  Shingrix: recommended   COVID-19: received per patient x 2      Colorectal cancer risk:  Risk factors: > 8 years of disease, > 1/3 colon involved  - Distribution of colonic disease: > 1/3 of colon  - Year of symptom onset: 2017  - Colonoscopy for surveillance starting 2025    Ophthalmologic exam recommended yearly  Dermatologic exam recommended yearly due to risk of skin cancer with IBD/meds    Bone health:  Calcium 0253-0046 mg daily and vitamin D 800 IU daily  Risk factors for osteopenia/osteoporosis:  Vitamin D: 27, recheck today  Ergocalciferol 50,000 IU weekly x 12 weeks  DEXA scan: will need baseline      Smoking status: current smoker      Follow up: following colonoscopy

## 2025-05-29 DIAGNOSIS — K50.80 CROHN'S DISEASE OF BOTH SMALL AND LARGE INTESTINE WITHOUT COMPLICATION: ICD-10-CM

## 2025-06-02 RX ORDER — USTEKINUMAB 90 MG/ML
INJECTION, SOLUTION SUBCUTANEOUS
Qty: 1 ML | Refills: 6 | Status: SHIPPED | OUTPATIENT
Start: 2025-06-02

## (undated) DEVICE — MANIFOLD 4 PORT

## (undated) DEVICE — KIT SURGICAL COLON .25 1.1OZ

## (undated) DEVICE — FORCEP ALLIGATOR 2.8MM W/NDL